# Patient Record
Sex: FEMALE | Race: OTHER | NOT HISPANIC OR LATINO
[De-identification: names, ages, dates, MRNs, and addresses within clinical notes are randomized per-mention and may not be internally consistent; named-entity substitution may affect disease eponyms.]

---

## 2017-06-06 PROBLEM — Z00.00 ENCOUNTER FOR PREVENTIVE HEALTH EXAMINATION: Status: ACTIVE | Noted: 2017-06-06

## 2017-06-07 ENCOUNTER — APPOINTMENT (OUTPATIENT)
Dept: NEPHROLOGY | Facility: CLINIC | Age: 32
End: 2017-06-07

## 2018-05-23 ENCOUNTER — EMERGENCY (EMERGENCY)
Facility: HOSPITAL | Age: 33
LOS: 1 days | Discharge: HOME | End: 2018-05-23
Attending: EMERGENCY MEDICINE | Admitting: EMERGENCY MEDICINE
Payer: MEDICAID

## 2018-05-23 VITALS
SYSTOLIC BLOOD PRESSURE: 147 MMHG | DIASTOLIC BLOOD PRESSURE: 94 MMHG | OXYGEN SATURATION: 100 % | RESPIRATION RATE: 16 BRPM | TEMPERATURE: 98 F | HEART RATE: 58 BPM

## 2018-05-23 PROCEDURE — 99282 EMERGENCY DEPT VISIT SF MDM: CPT

## 2018-05-23 NOTE — ED PROVIDER NOTE - MEDICAL DECISION MAKING DETAILS
33yo F pmhx HTN p/w CC elevated BP, normotensive in triage, asymptomatic. Will send home and rec outpatient follow up.

## 2018-05-23 NOTE — ED PROVIDER NOTE - OBJECTIVE STATEMENT
33yo F pmhx HTN p/w CC elevated BP. States that her BP has been intermittently elevated since yesterday, went as high as 180/100, reports she took her pressure earlier today because she had a headache and felt nauseous and noticed it was high. Pt. explains that she has been fasting and changed the schedule around which she takes her BP meds. Right now she is asymptomatic and has no pain/discomfort. No fever chills cp sob n/v/d.

## 2018-05-23 NOTE — ED PROVIDER NOTE - ATTENDING CONTRIBUTION TO CARE
I performed a face to face bedside interview with patient regarding history of present illness, review of symptoms and past medical history. I completed an independent physical exam.  I have discussed patient's plan of care.   I agree with note as stated above, having amended the EMR as needed to reflect my findings. I have discussed the assessment and plan of care.  This includes during the time I functioned as the attending physician for this patient.  Attending Contribution to Care: agree with plan of resident. pt p/w asymptomatic hypertension. states changed time of taking htn meds. no symptoms currently. stable for d/c. no htn here.

## 2018-05-23 NOTE — ED ADULT TRIAGE NOTE - CHIEF COMPLAINT QUOTE
Pt c/o HTN at home with systolic BP in 180s. Currently c/o HA and nausea. Denies vomiting, DP, dizziness.

## 2019-06-25 PROBLEM — I10 ESSENTIAL (PRIMARY) HYPERTENSION: Chronic | Status: ACTIVE | Noted: 2018-05-23

## 2019-06-28 ENCOUNTER — APPOINTMENT (OUTPATIENT)
Dept: OBGYN | Facility: CLINIC | Age: 34
End: 2019-06-28
Payer: MEDICAID

## 2019-06-28 ENCOUNTER — ASOB RESULT (OUTPATIENT)
Age: 34
End: 2019-06-28

## 2019-06-28 ENCOUNTER — LABORATORY RESULT (OUTPATIENT)
Age: 34
End: 2019-06-28

## 2019-06-28 VITALS
HEART RATE: 74 BPM | DIASTOLIC BLOOD PRESSURE: 87 MMHG | SYSTOLIC BLOOD PRESSURE: 136 MMHG | BODY MASS INDEX: 26.75 KG/M2 | WEIGHT: 151 LBS | HEIGHT: 63 IN

## 2019-06-28 PROCEDURE — 76817 TRANSVAGINAL US OBSTETRIC: CPT

## 2019-06-28 PROCEDURE — 99203 OFFICE O/P NEW LOW 30 MIN: CPT

## 2019-06-28 RX ORDER — NIFEDIPINE 90 MG/1
90 TABLET, EXTENDED RELEASE ORAL
Qty: 30 | Refills: 0 | Status: ACTIVE | COMMUNITY
Start: 2019-02-19

## 2019-06-28 RX ORDER — ERGOCALCIFEROL 1.25 MG/1
1.25 MG CAPSULE, LIQUID FILLED ORAL
Qty: 4 | Refills: 0 | Status: ACTIVE | COMMUNITY
Start: 2019-02-09

## 2019-06-28 RX ORDER — METHYLPREDNISOLONE 4 MG/1
4 TABLET ORAL
Qty: 21 | Refills: 0 | Status: ACTIVE | COMMUNITY
Start: 2019-02-05

## 2019-06-28 RX ORDER — OMEGA-3/DHA/EPA/FISH OIL 300-1000MG
1000 CAPSULE,DELAYED RELEASE (ENTERIC COATED) ORAL
Qty: 60 | Refills: 0 | Status: ACTIVE | COMMUNITY
Start: 2019-02-09

## 2019-06-28 RX ORDER — PRENATAL VIT NO.130/IRON/FOLIC 27MG-0.8MG
27-0.8 TABLET ORAL
Qty: 30 | Refills: 0 | Status: ACTIVE | COMMUNITY
Start: 2019-05-27

## 2019-06-28 NOTE — PHYSICAL EXAM
[Normal] : uterus [No Bleeding] : there was no active vaginal bleeding [Normal Position] : in a normal position [Uterine Adnexae] : were not tender and not enlarged

## 2019-06-28 NOTE — CHIEF COMPLAINT
[Initial Visit] : initial GYN visit [FreeTextEntry1] : 34 y.o. P 1011  LMP 19 presents for confirmation of pregnancy. pt feels  nauseous.  but well. PMH- HTN- labetalot 200mg TID, Nifedipine 90mg once daily , PCP - Dr. Sheffield. PSHx - Appendectomy- open in [facility] - . , FH- Diabetes - father, MGF, , HTN- father, CVA- father ( ) Asthma- mother , SH- negative, ROS - [employer] employee. through Q,  EGA by LMP is 5.4 weeks. OBGYN hx - primary C/S  x 1 Gestational Diabetes, , postpartum pre-eclampsia. C/S was done for unstable lie at [facility].  female, 6lbs 6oz. 2017. pt is s/p spont. ab x 1 no D&C.

## 2019-06-29 LAB
ALBUMIN SERPL ELPH-MCNC: 3.7 G/DL
ALP BLD-CCNC: 47 U/L
ALT SERPL-CCNC: 10 U/L
ANION GAP SERPL CALC-SCNC: 13 MMOL/L
AST SERPL-CCNC: 8 U/L
BASOPHILS # BLD AUTO: 0.04 K/UL
BASOPHILS NFR BLD AUTO: 0.5 %
BILIRUB SERPL-MCNC: 0.3 MG/DL
BUN SERPL-MCNC: 35 MG/DL
CALCIUM SERPL-MCNC: 10.1 MG/DL
CHLORIDE SERPL-SCNC: 105 MMOL/L
CO2 SERPL-SCNC: 19 MMOL/L
CREAT SERPL-MCNC: 1.54 MG/DL
EOSINOPHIL # BLD AUTO: 0.53 K/UL
EOSINOPHIL NFR BLD AUTO: 6.3 %
GLUCOSE 1H P 50 G GLC PO SERPL-MCNC: 94 MG/DL
GLUCOSE SERPL-MCNC: 97 MG/DL
HBV SURFACE AG SER QL: NONREACTIVE
HCT VFR BLD CALC: 37.2 %
HCV AB SER QL: NONREACTIVE
HCV S/CO RATIO: 0.15 S/CO
HGB BLD-MCNC: 11.6 G/DL
HIV1+2 AB SPEC QL IA.RAPID: NONREACTIVE
IMM GRANULOCYTES NFR BLD AUTO: 0.4 %
LYMPHOCYTES # BLD AUTO: 1.48 K/UL
LYMPHOCYTES NFR BLD AUTO: 17.5 %
MAN DIFF?: NORMAL
MCHC RBC-ENTMCNC: 27.9 PG
MCHC RBC-ENTMCNC: 31.2 GM/DL
MCV RBC AUTO: 89.4 FL
MONOCYTES # BLD AUTO: 0.65 K/UL
MONOCYTES NFR BLD AUTO: 7.7 %
NEUTROPHILS # BLD AUTO: 5.73 K/UL
NEUTROPHILS NFR BLD AUTO: 67.6 %
PLATELET # BLD AUTO: 272 K/UL
POTASSIUM SERPL-SCNC: 4.4 MMOL/L
PROT SERPL-MCNC: 6.4 G/DL
RBC # BLD: 4.16 M/UL
RBC # FLD: 14 %
SODIUM SERPL-SCNC: 137 MMOL/L
T PALLIDUM AB SER QL IA: NEGATIVE
URATE SERPL-MCNC: 8.3 MG/DL
WBC # FLD AUTO: 8.46 K/UL

## 2019-07-01 ENCOUNTER — OTHER (OUTPATIENT)
Age: 34
End: 2019-07-01

## 2019-07-01 LAB
ABO + RH PNL BLD: NORMAL
BACTERIA UR CULT: NORMAL
BLD GP AB SCN SERPL QL: NORMAL
C TRACH RRNA SPEC QL NAA+PROBE: NOT DETECTED
MEV IGG FLD QL IA: >300 AU/ML
MEV IGG+IGM SER-IMP: POSITIVE
N GONORRHOEA RRNA SPEC QL NAA+PROBE: NOT DETECTED
RUBV IGG FLD-ACNC: 2.3 INDEX
RUBV IGG SER-IMP: POSITIVE
SOURCE AMPLIFICATION: NORMAL
VZV AB TITR SER: POSITIVE
VZV IGG SER IF-ACNC: 512 INDEX

## 2019-07-02 ENCOUNTER — APPOINTMENT (OUTPATIENT)
Dept: OBGYN | Facility: CLINIC | Age: 34
End: 2019-07-02

## 2019-07-02 LAB
HGB A MFR BLD: 97.7 %
HGB A2 MFR BLD: 2.3 %
HGB FRACT BLD-IMP: NORMAL

## 2019-07-03 LAB
CREAT 24H UR-MCNC: 1.2 G/24 H
CREAT ?TM UR-MCNC: 40 MG/DL
MEV IGM SER QL: NEGATIVE
PROT 24H UR-MRATE: 79 MG/DL
PROT ?TM UR-MCNC: 24 HR
PROT UR-MCNC: 2291 MG/24 H
SPECIMEN VOL 24H UR: 2900 ML

## 2019-07-05 LAB
B19V IGG SER QL IA: 0.4 INDEX
B19V IGG+IGM SER-IMP: NEGATIVE
B19V IGG+IGM SER-IMP: NORMAL
B19V IGM FLD-ACNC: 0.3 INDEX
B19V IGM SER-ACNC: NEGATIVE
CYTOLOGY CVX/VAG DOC THIN PREP: NORMAL

## 2019-07-08 ENCOUNTER — ASOB RESULT (OUTPATIENT)
Age: 34
End: 2019-07-08

## 2019-07-08 ENCOUNTER — APPOINTMENT (OUTPATIENT)
Dept: OBGYN | Facility: CLINIC | Age: 34
End: 2019-07-08
Payer: MEDICAID

## 2019-07-08 LAB
AR GENE MUT ANL BLD/T: NEGATIVE
CFTR MUT TESTED BLD/T: NEGATIVE
FMR1 GENE MUT ANL BLD/T: NORMAL

## 2019-07-08 PROCEDURE — 76817 TRANSVAGINAL US OBSTETRIC: CPT

## 2019-07-09 ENCOUNTER — APPOINTMENT (OUTPATIENT)
Dept: OBGYN | Facility: CLINIC | Age: 34
End: 2019-07-09
Payer: MEDICAID

## 2019-07-09 VITALS
SYSTOLIC BLOOD PRESSURE: 143 MMHG | WEIGHT: 154.25 LBS | BODY MASS INDEX: 27.33 KG/M2 | HEIGHT: 63 IN | DIASTOLIC BLOOD PRESSURE: 92 MMHG

## 2019-07-09 PROCEDURE — 99213 OFFICE O/P EST LOW 20 MIN: CPT

## 2019-07-09 NOTE — CHIEF COMPLAINT
[Follow Up] : follow up GYN visit [FreeTextEntry1] : pt feels well today, pt reports that her blood pressures are lower at home, but she only takes them first thing in the morning and at night before bedtime. B.P. today is 143/92. pt is on Labetalol 200mg BID and Nifedipine 90mg once daily. \par reviewed with pt initial prenatal labs , cautioned pt re non-immunity to Parvovirus. \par Also discussed greater than 2 grams of proteinuria on 24 hour specimen. as well as borderline elevation of BUN/Creat. and elevated uric acid. , which will require close f/u post pregnancy and beyond.

## 2019-07-20 ENCOUNTER — EMERGENCY (EMERGENCY)
Facility: HOSPITAL | Age: 34
LOS: 1 days | Discharge: HOME | End: 2019-07-20
Attending: EMERGENCY MEDICINE | Admitting: EMERGENCY MEDICINE
Payer: MEDICAID

## 2019-07-20 VITALS
OXYGEN SATURATION: 100 % | HEART RATE: 71 BPM | TEMPERATURE: 98 F | DIASTOLIC BLOOD PRESSURE: 88 MMHG | RESPIRATION RATE: 18 BRPM | SYSTOLIC BLOOD PRESSURE: 142 MMHG

## 2019-07-20 PROCEDURE — 99284 EMERGENCY DEPT VISIT MOD MDM: CPT

## 2019-07-20 NOTE — ED ADULT TRIAGE NOTE - CHIEF COMPLAINT QUOTE
Presents to ED for headache.  Patient is 8 wks pregnant and states since this morning she hasn't been feeling well.  Complaining of elevated BP, headache, vomiting, fatigue and loss of appetite.  /103 prior to arriving in ED.  She took her home BP meds prior to arriving at the ED.  Denies chest pain, abdominal pain or dizziness.  LMP: 05/21/19

## 2019-07-21 VITALS
TEMPERATURE: 98 F | SYSTOLIC BLOOD PRESSURE: 114 MMHG | DIASTOLIC BLOOD PRESSURE: 65 MMHG | HEART RATE: 72 BPM | OXYGEN SATURATION: 100 % | RESPIRATION RATE: 16 BRPM

## 2019-07-21 LAB
ALBUMIN SERPL ELPH-MCNC: 3.5 G/DL — SIGNIFICANT CHANGE UP (ref 3.3–5)
ALP SERPL-CCNC: 44 U/L — SIGNIFICANT CHANGE UP (ref 40–120)
ALT FLD-CCNC: 11 U/L — SIGNIFICANT CHANGE UP (ref 4–33)
ANION GAP SERPL CALC-SCNC: 13 MMO/L — SIGNIFICANT CHANGE UP (ref 7–14)
APPEARANCE UR: CLEAR — SIGNIFICANT CHANGE UP
APTT BLD: 28.5 SEC — SIGNIFICANT CHANGE UP (ref 27.5–36.3)
AST SERPL-CCNC: 11 U/L — SIGNIFICANT CHANGE UP (ref 4–32)
BACTERIA # UR AUTO: NEGATIVE — SIGNIFICANT CHANGE UP
BASOPHILS # BLD AUTO: 0.03 K/UL — SIGNIFICANT CHANGE UP (ref 0–0.2)
BASOPHILS NFR BLD AUTO: 0.3 % — SIGNIFICANT CHANGE UP (ref 0–2)
BILIRUB SERPL-MCNC: 0.2 MG/DL — SIGNIFICANT CHANGE UP (ref 0.2–1.2)
BILIRUB UR-MCNC: NEGATIVE — SIGNIFICANT CHANGE UP
BLOOD UR QL VISUAL: SIGNIFICANT CHANGE UP
BUN SERPL-MCNC: 22 MG/DL — SIGNIFICANT CHANGE UP (ref 7–23)
CALCIUM SERPL-MCNC: 9.7 MG/DL — SIGNIFICANT CHANGE UP (ref 8.4–10.5)
CHLORIDE SERPL-SCNC: 107 MMOL/L — SIGNIFICANT CHANGE UP (ref 98–107)
CO2 SERPL-SCNC: 18 MMOL/L — LOW (ref 22–31)
COLOR SPEC: COLORLESS — SIGNIFICANT CHANGE UP
CREAT ?TM UR-MCNC: 34.8 MG/DL — SIGNIFICANT CHANGE UP
CREAT SERPL-MCNC: 1.38 MG/DL — HIGH (ref 0.5–1.3)
EOSINOPHIL # BLD AUTO: 0.64 K/UL — HIGH (ref 0–0.5)
EOSINOPHIL NFR BLD AUTO: 6 % — SIGNIFICANT CHANGE UP (ref 0–6)
FIBRINOGEN PPP-MCNC: 600.8 MG/DL — HIGH (ref 350–510)
GLUCOSE SERPL-MCNC: 96 MG/DL — SIGNIFICANT CHANGE UP (ref 70–99)
GLUCOSE UR-MCNC: NEGATIVE — SIGNIFICANT CHANGE UP
HCG SERPL-ACNC: SIGNIFICANT CHANGE UP MIU/ML
HCT VFR BLD CALC: 33.4 % — LOW (ref 34.5–45)
HGB BLD-MCNC: 10.8 G/DL — LOW (ref 11.5–15.5)
HYALINE CASTS # UR AUTO: NEGATIVE — SIGNIFICANT CHANGE UP
IMM GRANULOCYTES NFR BLD AUTO: 0.4 % — SIGNIFICANT CHANGE UP (ref 0–1.5)
INR BLD: 0.97 — SIGNIFICANT CHANGE UP (ref 0.88–1.17)
KETONES UR-MCNC: NEGATIVE — SIGNIFICANT CHANGE UP
LDH SERPL L TO P-CCNC: 143 U/L — SIGNIFICANT CHANGE UP (ref 135–225)
LEUKOCYTE ESTERASE UR-ACNC: NEGATIVE — SIGNIFICANT CHANGE UP
LIDOCAIN IGE QN: 56 U/L — SIGNIFICANT CHANGE UP (ref 7–60)
LYMPHOCYTES # BLD AUTO: 1.86 K/UL — SIGNIFICANT CHANGE UP (ref 1–3.3)
LYMPHOCYTES # BLD AUTO: 17.6 % — SIGNIFICANT CHANGE UP (ref 13–44)
MCHC RBC-ENTMCNC: 28.6 PG — SIGNIFICANT CHANGE UP (ref 27–34)
MCHC RBC-ENTMCNC: 32.3 % — SIGNIFICANT CHANGE UP (ref 32–36)
MCV RBC AUTO: 88.4 FL — SIGNIFICANT CHANGE UP (ref 80–100)
MONOCYTES # BLD AUTO: 0.89 K/UL — SIGNIFICANT CHANGE UP (ref 0–0.9)
MONOCYTES NFR BLD AUTO: 8.4 % — SIGNIFICANT CHANGE UP (ref 2–14)
NEUTROPHILS # BLD AUTO: 7.12 K/UL — SIGNIFICANT CHANGE UP (ref 1.8–7.4)
NEUTROPHILS NFR BLD AUTO: 67.3 % — SIGNIFICANT CHANGE UP (ref 43–77)
NITRITE UR-MCNC: NEGATIVE — SIGNIFICANT CHANGE UP
NRBC # FLD: 0 K/UL — SIGNIFICANT CHANGE UP (ref 0–0)
PH UR: 6 — SIGNIFICANT CHANGE UP (ref 5–8)
PLATELET # BLD AUTO: 256 K/UL — SIGNIFICANT CHANGE UP (ref 150–400)
PMV BLD: 11 FL — SIGNIFICANT CHANGE UP (ref 7–13)
POTASSIUM SERPL-MCNC: 4.2 MMOL/L — SIGNIFICANT CHANGE UP (ref 3.5–5.3)
POTASSIUM SERPL-SCNC: 4.2 MMOL/L — SIGNIFICANT CHANGE UP (ref 3.5–5.3)
PROT SERPL-MCNC: 6.9 G/DL — SIGNIFICANT CHANGE UP (ref 6–8.3)
PROT UR-MCNC: 100 — HIGH
PROT UR-MCNC: 105.4 MG/DL — SIGNIFICANT CHANGE UP
PROTHROM AB SERPL-ACNC: 10.7 SEC — SIGNIFICANT CHANGE UP (ref 9.8–13.1)
RBC # BLD: 3.78 M/UL — LOW (ref 3.8–5.2)
RBC # FLD: 13.7 % — SIGNIFICANT CHANGE UP (ref 10.3–14.5)
RBC CASTS # UR COMP ASSIST: SIGNIFICANT CHANGE UP (ref 0–?)
SODIUM SERPL-SCNC: 138 MMOL/L — SIGNIFICANT CHANGE UP (ref 135–145)
SP GR SPEC: 1.01 — SIGNIFICANT CHANGE UP (ref 1–1.04)
SQUAMOUS # UR AUTO: SIGNIFICANT CHANGE UP
URATE SERPL-MCNC: 7.9 MG/DL — HIGH (ref 2.5–7)
UROBILINOGEN FLD QL: NORMAL — SIGNIFICANT CHANGE UP
WBC # BLD: 10.58 K/UL — HIGH (ref 3.8–10.5)
WBC # FLD AUTO: 10.58 K/UL — HIGH (ref 3.8–10.5)
WBC UR QL: SIGNIFICANT CHANGE UP (ref 0–?)

## 2019-07-21 RX ORDER — SODIUM CHLORIDE 9 MG/ML
1000 INJECTION INTRAMUSCULAR; INTRAVENOUS; SUBCUTANEOUS ONCE
Refills: 0 | Status: COMPLETED | OUTPATIENT
Start: 2019-07-21 | End: 2019-07-21

## 2019-07-21 RX ORDER — METOCLOPRAMIDE HCL 10 MG
10 TABLET ORAL ONCE
Refills: 0 | Status: COMPLETED | OUTPATIENT
Start: 2019-07-21 | End: 2019-07-21

## 2019-07-21 RX ORDER — FAMOTIDINE 10 MG/ML
20 INJECTION INTRAVENOUS ONCE
Refills: 0 | Status: COMPLETED | OUTPATIENT
Start: 2019-07-21 | End: 2019-07-21

## 2019-07-21 RX ORDER — ACETAMINOPHEN 500 MG
975 TABLET ORAL ONCE
Refills: 0 | Status: COMPLETED | OUTPATIENT
Start: 2019-07-21 | End: 2019-07-21

## 2019-07-21 RX ADMIN — FAMOTIDINE 20 MILLIGRAM(S): 10 INJECTION INTRAVENOUS at 00:48

## 2019-07-21 RX ADMIN — SODIUM CHLORIDE 1000 MILLILITER(S): 9 INJECTION INTRAMUSCULAR; INTRAVENOUS; SUBCUTANEOUS at 00:49

## 2019-07-21 RX ADMIN — Medication 975 MILLIGRAM(S): at 00:48

## 2019-07-21 RX ADMIN — Medication 10 MILLIGRAM(S): at 00:48

## 2019-07-21 NOTE — ED ADULT NURSE NOTE - NSIMPLEMENTINTERV_GEN_ALL_ED
Implemented All Universal Safety Interventions:  [place] to call system. Call bell, personal items and telephone within reach. Instruct patient to call for assistance. Room bathroom lighting operational. Non-slip footwear when patient is off stretcher. Physically safe environment: no spills, clutter or unnecessary equipment. Stretcher in lowest position, wheels locked, appropriate side rails in place.

## 2019-07-21 NOTE — ED PROVIDER NOTE - NS ED ROS FT
CONSTITUTIONAL: No fevers, no chills, no lightheadedness, no dizziness  Eyes: no visual changes  Ears: no ear drainage, no ear pain  Nose: no nasal congestion  Mouth/Throat: no sore throat  CV: No chest pain, no palpitations  PULM: No SOB, no cough  GI: No n/v/d, no abd pain  : no dysuria, no hematuria  SKIN: no rashes.  NEURO: no focal weakness or numbness  PSYCHIATRIC: no known mental health issues.

## 2019-07-21 NOTE — ED PROVIDER NOTE - OBJECTIVE STATEMENT
33yo F hx htn  8 wks pregnant confirmed IUP outpt p/w R sided HA (chart review shows pt has hx of HA), gradual onset and found self to be hypertensive to 160s systolic at home, 140s in triage. Denies blurry vision, cp, sob, abd pain, n/v/d, pelvic cramping, vaginal bleeding/discharge.

## 2019-07-21 NOTE — ED PROVIDER NOTE - PROGRESS NOTE DETAILS
Jayesh Cristobal DO PGY2 - bp normalized with pain control. Can f/u safely with OB this week. Pt agrees and wants to go home

## 2019-07-21 NOTE — ED PROVIDER NOTE - ATTENDING CONTRIBUTION TO CARE
MD Pradhan:  I performed a face to face bedside interview with patient regarding history of present illness, review of symptoms and past medical history. I completed an independent physical exam(documented below).  I have discussed patient's plan of care with resident.   I agree with note as stated above, having amended the EMR as needed to reflect my findings. I have discussed the assessment and plan of care.  This includes during the time I functioned as the attending physician for this patient.  PE:  Gen: Alert, NAD  Head: NC, AT,  EOMI, normal lids/conjunctiva  ENT:  normal hearing, patent oropharynx without erythema/exudate  Neck: +supple, no tenderness/meningismus/JVD, +Trachea midline  Chest: no chest wall tenderness, equal chest rise  Pulm: Bilateral BS, normal resp effort, no wheeze/stridor/retractions  CV: RRR, no M/R/G, +dist pulses  Abd: +BS, soft, NT/ND  Rectal: deferred  Mskel: no edema/erythema/cyanosis  Skin: no rash  Neuro: AAOx3, no sensory/motor deficits, CN 2-12 intact   MDM:  35yo F,  currently @ 8wks gestation (confirmed by outpt US), pmh of pre-eclampsia and subsequently dx w/ htn, presents w/ R sided headache and hypertensive to 160s (systolic) at home. Denies abd/pelvic pain or vaginal bleeding. BP normalized to 114/65 after symptoms treated and relieved. Labs obtained, will f/u with OBGYN/pmd outpt.

## 2019-07-21 NOTE — ED PROVIDER NOTE - CLINICAL SUMMARY MEDICAL DECISION MAKING FREE TEXT BOX
Hx chronic htn unlikely new gestational htn but will get labs to r/o hypertensive OB emergencies. PAin control and reassess bp.

## 2019-07-21 NOTE — ED PROVIDER NOTE - NSFOLLOWUPINSTRUCTIONS_ED_ALL_ED_FT
- Follow up with your OB this week    - Lab and imaging results, if performed, were discussed with you along with your discharge diagnosis    - Follow up with your doctor in 1 week - bring copies of your results if you were given. If you do not have a primary doctor, please call [phone] to find one convenient for you    - Return to the ED for any new, worsening, or concerning symptoms to you    - Continue all prescribed medications    - Take ibuprofen/tylenol as directed as needed for pain    - Rest and keep yourself hydrated with fluids

## 2019-07-21 NOTE — ED ADULT NURSE NOTE - OBJECTIVE STATEMENT
received pt to room 5.. Pt is alert and oriented x4, ambulatory, who is 8 weeks pregnant and is having elevated BP and headache. No dizziness or Cp or vaginal bleeding. Pt is on Bp[ meds. c/o nausea. Labs sent as per order. 20 G SL placed in the Right AC. Md evaluation in progress.

## 2019-07-23 ENCOUNTER — APPOINTMENT (OUTPATIENT)
Dept: OBGYN | Facility: CLINIC | Age: 34
End: 2019-07-23
Payer: MEDICAID

## 2019-07-23 VITALS
WEIGHT: 153 LBS | DIASTOLIC BLOOD PRESSURE: 91 MMHG | HEIGHT: 63 IN | BODY MASS INDEX: 27.11 KG/M2 | SYSTOLIC BLOOD PRESSURE: 146 MMHG

## 2019-07-23 DIAGNOSIS — Z32.01 ENCOUNTER FOR PREGNANCY TEST, RESULT POSITIVE: ICD-10-CM

## 2019-07-23 PROCEDURE — 99213 OFFICE O/P EST LOW 20 MIN: CPT

## 2019-07-23 RX ORDER — LABETALOL HYDROCHLORIDE 300 MG/1
300 TABLET, FILM COATED ORAL
Qty: 100 | Refills: 3 | Status: ACTIVE | COMMUNITY
Start: 2019-07-23 | End: 1900-01-01

## 2019-07-23 NOTE — CHIEF COMPLAINT
[Follow Up] : follow up GYN visit [FreeTextEntry1] : pt seen in ED for elevated B.P. at home. pt evaluated and discharged. and told to follow up with PMD. pt is also c/o vaginal discharge

## 2019-08-06 ENCOUNTER — APPOINTMENT (OUTPATIENT)
Dept: OBGYN | Facility: CLINIC | Age: 34
End: 2019-08-06
Payer: MEDICAID

## 2019-08-06 VITALS
BODY MASS INDEX: 27.46 KG/M2 | DIASTOLIC BLOOD PRESSURE: 88 MMHG | HEIGHT: 63 IN | WEIGHT: 155 LBS | SYSTOLIC BLOOD PRESSURE: 137 MMHG

## 2019-08-06 PROCEDURE — 0501F PRENATAL FLOW SHEET: CPT

## 2019-08-16 ENCOUNTER — APPOINTMENT (OUTPATIENT)
Dept: ANTEPARTUM | Facility: CLINIC | Age: 34
End: 2019-08-16
Payer: MEDICAID

## 2019-08-16 ENCOUNTER — LABORATORY RESULT (OUTPATIENT)
Age: 34
End: 2019-08-16

## 2019-08-16 ENCOUNTER — ASOB RESULT (OUTPATIENT)
Age: 34
End: 2019-08-16

## 2019-08-16 PROCEDURE — 76801 OB US < 14 WKS SINGLE FETUS: CPT

## 2019-08-16 PROCEDURE — 76813 OB US NUCHAL MEAS 1 GEST: CPT

## 2019-08-16 PROCEDURE — 36416 COLLJ CAPILLARY BLOOD SPEC: CPT

## 2019-08-20 ENCOUNTER — LABORATORY RESULT (OUTPATIENT)
Age: 34
End: 2019-08-20

## 2019-09-03 ENCOUNTER — APPOINTMENT (OUTPATIENT)
Dept: OBGYN | Facility: CLINIC | Age: 34
End: 2019-09-03
Payer: MEDICAID

## 2019-09-03 VITALS
SYSTOLIC BLOOD PRESSURE: 132 MMHG | DIASTOLIC BLOOD PRESSURE: 86 MMHG | WEIGHT: 156.31 LBS | HEIGHT: 63 IN | BODY MASS INDEX: 27.7 KG/M2

## 2019-09-03 PROCEDURE — 0502F SUBSEQUENT PRENATAL CARE: CPT

## 2019-09-30 ENCOUNTER — LABORATORY RESULT (OUTPATIENT)
Age: 34
End: 2019-09-30

## 2019-10-01 ENCOUNTER — APPOINTMENT (OUTPATIENT)
Dept: OBGYN | Facility: CLINIC | Age: 34
End: 2019-10-01
Payer: MEDICAID

## 2019-10-01 VITALS
SYSTOLIC BLOOD PRESSURE: 133 MMHG | DIASTOLIC BLOOD PRESSURE: 85 MMHG | BODY MASS INDEX: 29.06 KG/M2 | HEIGHT: 63 IN | WEIGHT: 164 LBS

## 2019-10-01 PROCEDURE — 0502F SUBSEQUENT PRENATAL CARE: CPT

## 2019-10-18 ENCOUNTER — APPOINTMENT (OUTPATIENT)
Dept: ANTEPARTUM | Facility: CLINIC | Age: 34
End: 2019-10-18
Payer: MEDICAID

## 2019-10-18 PROCEDURE — 76811 OB US DETAILED SNGL FETUS: CPT

## 2019-10-29 ENCOUNTER — APPOINTMENT (OUTPATIENT)
Dept: OBGYN | Facility: CLINIC | Age: 34
End: 2019-10-29
Payer: MEDICAID

## 2019-10-29 VITALS
HEIGHT: 63 IN | WEIGHT: 164 LBS | SYSTOLIC BLOOD PRESSURE: 139 MMHG | BODY MASS INDEX: 29.06 KG/M2 | DIASTOLIC BLOOD PRESSURE: 84 MMHG

## 2019-10-29 PROCEDURE — 0502F SUBSEQUENT PRENATAL CARE: CPT

## 2019-10-30 LAB
ALBUMIN SERPL ELPH-MCNC: 3.5 G/DL
ALP BLD-CCNC: 54 U/L
ALT SERPL-CCNC: 15 U/L
ANION GAP SERPL CALC-SCNC: 12 MMOL/L
AST SERPL-CCNC: 16 U/L
BILIRUB SERPL-MCNC: <0.2 MG/DL
BUN SERPL-MCNC: 27 MG/DL
CALCIUM SERPL-MCNC: 9 MG/DL
CHLORIDE SERPL-SCNC: 107 MMOL/L
CO2 SERPL-SCNC: 18 MMOL/L
CREAT SERPL-MCNC: 1.57 MG/DL
GLUCOSE SERPL-MCNC: 74 MG/DL
POTASSIUM SERPL-SCNC: 4.8 MMOL/L
PROT SERPL-MCNC: 6.2 G/DL
SODIUM SERPL-SCNC: 137 MMOL/L

## 2019-11-01 LAB — BILE AC SER-MCNC: 3.3 UMOL/L

## 2019-11-04 LAB
BILEACIDS T: 3.1 UMOL/L
CHENDEOXYCHOLIC ACID: 1.7 UMOL/L
CHOLIC ACID: 1.4 UMOL/L
DEOXYCHOLIC ACID: <0.1 UMOL/L
URSODEOXYCH: <0.1 UMOL/L

## 2019-11-15 ENCOUNTER — ASOB RESULT (OUTPATIENT)
Age: 34
End: 2019-11-15

## 2019-11-15 ENCOUNTER — APPOINTMENT (OUTPATIENT)
Dept: ANTEPARTUM | Facility: CLINIC | Age: 34
End: 2019-11-15
Payer: MEDICAID

## 2019-11-15 PROCEDURE — 76816 OB US FOLLOW-UP PER FETUS: CPT

## 2019-11-26 ENCOUNTER — APPOINTMENT (OUTPATIENT)
Dept: OBGYN | Facility: CLINIC | Age: 34
End: 2019-11-26
Payer: MEDICAID

## 2019-11-26 VITALS
BODY MASS INDEX: 30.48 KG/M2 | SYSTOLIC BLOOD PRESSURE: 146 MMHG | HEIGHT: 63 IN | DIASTOLIC BLOOD PRESSURE: 86 MMHG | WEIGHT: 172 LBS

## 2019-11-26 PROCEDURE — 90471 IMMUNIZATION ADMIN: CPT

## 2019-11-26 PROCEDURE — 90688 IIV4 VACCINE SPLT 0.5 ML IM: CPT

## 2019-11-26 PROCEDURE — 0502F SUBSEQUENT PRENATAL CARE: CPT

## 2019-11-27 LAB
BASOPHILS # BLD AUTO: 0.04 K/UL
BASOPHILS NFR BLD AUTO: 0.3 %
EOSINOPHIL # BLD AUTO: 0.58 K/UL
EOSINOPHIL NFR BLD AUTO: 4.6 %
GLUCOSE 1H P 50 G GLC PO SERPL-MCNC: 103 MG/DL
HCT VFR BLD CALC: 29.5 %
HGB BLD-MCNC: 9.3 G/DL
IMM GRANULOCYTES NFR BLD AUTO: 2.6 %
LYMPHOCYTES # BLD AUTO: 1.55 K/UL
LYMPHOCYTES NFR BLD AUTO: 12.3 %
MAN DIFF?: NORMAL
MCHC RBC-ENTMCNC: 29.6 PG
MCHC RBC-ENTMCNC: 31.5 GM/DL
MCV RBC AUTO: 93.9 FL
MONOCYTES # BLD AUTO: 1.02 K/UL
MONOCYTES NFR BLD AUTO: 8.1 %
NEUTROPHILS # BLD AUTO: 9.04 K/UL
NEUTROPHILS NFR BLD AUTO: 72.1 %
PLATELET # BLD AUTO: 266 K/UL
RBC # BLD: 3.14 M/UL
RBC # FLD: 13.9 %
WBC # FLD AUTO: 12.56 K/UL

## 2019-12-10 ENCOUNTER — APPOINTMENT (OUTPATIENT)
Dept: OBGYN | Facility: CLINIC | Age: 34
End: 2019-12-10
Payer: MEDICAID

## 2019-12-10 VITALS
WEIGHT: 173 LBS | DIASTOLIC BLOOD PRESSURE: 92 MMHG | SYSTOLIC BLOOD PRESSURE: 156 MMHG | HEIGHT: 63 IN | BODY MASS INDEX: 30.65 KG/M2

## 2019-12-10 PROCEDURE — 0502F SUBSEQUENT PRENATAL CARE: CPT

## 2019-12-10 RX ORDER — HYDROCORTISONE ACETATE AND PRAMOXINE HYDROCHLORIDE 25; 10 MG/G; MG/G
2.5-1 CREAM TOPICAL
Qty: 2 | Refills: 1 | Status: ACTIVE | COMMUNITY
Start: 2019-12-10 | End: 1900-01-01

## 2019-12-13 ENCOUNTER — APPOINTMENT (OUTPATIENT)
Dept: ANTEPARTUM | Facility: CLINIC | Age: 34
End: 2019-12-13
Payer: MEDICAID

## 2019-12-13 ENCOUNTER — OTHER (OUTPATIENT)
Age: 34
End: 2019-12-13

## 2019-12-13 ENCOUNTER — ASOB RESULT (OUTPATIENT)
Age: 34
End: 2019-12-13

## 2019-12-13 PROCEDURE — 76819 FETAL BIOPHYS PROFIL W/O NST: CPT

## 2019-12-13 PROCEDURE — 76816 OB US FOLLOW-UP PER FETUS: CPT

## 2019-12-19 ENCOUNTER — OTHER (OUTPATIENT)
Age: 34
End: 2019-12-19

## 2019-12-23 ENCOUNTER — APPOINTMENT (OUTPATIENT)
Dept: OBGYN | Facility: CLINIC | Age: 34
End: 2019-12-23

## 2019-12-24 ENCOUNTER — APPOINTMENT (OUTPATIENT)
Dept: OBGYN | Facility: CLINIC | Age: 34
End: 2019-12-24
Payer: MEDICAID

## 2019-12-24 VITALS
DIASTOLIC BLOOD PRESSURE: 88 MMHG | SYSTOLIC BLOOD PRESSURE: 148 MMHG | WEIGHT: 173.13 LBS | HEIGHT: 63 IN | BODY MASS INDEX: 30.68 KG/M2

## 2019-12-24 PROCEDURE — 0502F SUBSEQUENT PRENATAL CARE: CPT

## 2020-01-06 LAB
ALBUMIN SERPL ELPH-MCNC: 3.1 G/DL
ALP BLD-CCNC: 73 U/L
ALT SERPL-CCNC: 11 U/L
ANION GAP SERPL CALC-SCNC: 14 MMOL/L
AST SERPL-CCNC: 16 U/L
BILE AC SER-MCNC: 3.3 UMOL/L
BILEACIDS T: 3 UMOL/L
BILIRUB SERPL-MCNC: <0.2 MG/DL
BUN SERPL-MCNC: 29 MG/DL
CALCIUM SERPL-MCNC: 9.2 MG/DL
CHENDEOXYCHOLIC ACID: 1.5 UMOL/L
CHLORIDE SERPL-SCNC: 107 MMOL/L
CHOLIC ACID: 1 UMOL/L
CO2 SERPL-SCNC: 16 MMOL/L
CREAT SERPL-MCNC: 1.65 MG/DL
DEOXYCHOLIC ACID: 0.5 UMOL/L
GLUCOSE SERPL-MCNC: 87 MG/DL
POTASSIUM SERPL-SCNC: 4.8 MMOL/L
PROT SERPL-MCNC: 6.1 G/DL
SODIUM SERPL-SCNC: 137 MMOL/L
URSODEOXYCH: <0.1 UMOL/L

## 2020-01-10 ENCOUNTER — APPOINTMENT (OUTPATIENT)
Dept: OBGYN | Facility: CLINIC | Age: 35
End: 2020-01-10
Payer: MEDICAID

## 2020-01-10 ENCOUNTER — APPOINTMENT (OUTPATIENT)
Dept: ANTEPARTUM | Facility: CLINIC | Age: 35
End: 2020-01-10
Payer: MEDICAID

## 2020-01-10 ENCOUNTER — ASOB RESULT (OUTPATIENT)
Age: 35
End: 2020-01-10

## 2020-01-10 VITALS
BODY MASS INDEX: 31.27 KG/M2 | HEIGHT: 63 IN | WEIGHT: 176.5 LBS | DIASTOLIC BLOOD PRESSURE: 96 MMHG | SYSTOLIC BLOOD PRESSURE: 158 MMHG

## 2020-01-10 PROCEDURE — 76819 FETAL BIOPHYS PROFIL W/O NST: CPT

## 2020-01-10 PROCEDURE — 0502F SUBSEQUENT PRENATAL CARE: CPT

## 2020-01-10 PROCEDURE — 76816 OB US FOLLOW-UP PER FETUS: CPT

## 2020-01-21 ENCOUNTER — APPOINTMENT (OUTPATIENT)
Dept: OBGYN | Facility: CLINIC | Age: 35
End: 2020-01-21
Payer: MEDICAID

## 2020-01-21 VITALS
WEIGHT: 178.8 LBS | SYSTOLIC BLOOD PRESSURE: 145 MMHG | DIASTOLIC BLOOD PRESSURE: 95 MMHG | BODY MASS INDEX: 31.68 KG/M2 | HEIGHT: 63 IN

## 2020-01-21 PROCEDURE — 0502F SUBSEQUENT PRENATAL CARE: CPT

## 2020-02-04 ENCOUNTER — APPOINTMENT (OUTPATIENT)
Dept: OBGYN | Facility: CLINIC | Age: 35
End: 2020-02-04
Payer: MEDICAID

## 2020-02-04 VITALS
WEIGHT: 180 LBS | SYSTOLIC BLOOD PRESSURE: 165 MMHG | HEIGHT: 63 IN | DIASTOLIC BLOOD PRESSURE: 99 MMHG | BODY MASS INDEX: 31.89 KG/M2

## 2020-02-04 PROCEDURE — 0502F SUBSEQUENT PRENATAL CARE: CPT

## 2020-02-05 LAB
ALBUMIN SERPL ELPH-MCNC: 3 G/DL
ALP BLD-CCNC: 108 U/L
ALT SERPL-CCNC: 9 U/L
ANION GAP SERPL CALC-SCNC: 17 MMOL/L
AST SERPL-CCNC: 13 U/L
BASOPHILS # BLD AUTO: 0.09 K/UL
BASOPHILS NFR BLD AUTO: 0.7 %
BILIRUB SERPL-MCNC: <0.2 MG/DL
BUN SERPL-MCNC: 36 MG/DL
CALCIUM SERPL-MCNC: 8.8 MG/DL
CHLORIDE SERPL-SCNC: 107 MMOL/L
CO2 SERPL-SCNC: 14 MMOL/L
CREAT SERPL-MCNC: 2.24 MG/DL
EOSINOPHIL # BLD AUTO: 0.41 K/UL
EOSINOPHIL NFR BLD AUTO: 3.3 %
GLUCOSE SERPL-MCNC: 44 MG/DL
HCT VFR BLD CALC: 31.3 %
HGB BLD-MCNC: 9.8 G/DL
IMM GRANULOCYTES NFR BLD AUTO: 5.7 %
LYMPHOCYTES # BLD AUTO: 1.38 K/UL
LYMPHOCYTES NFR BLD AUTO: 11.2 %
MAN DIFF?: NORMAL
MCHC RBC-ENTMCNC: 29.5 PG
MCHC RBC-ENTMCNC: 31.3 GM/DL
MCV RBC AUTO: 94.3 FL
MONOCYTES # BLD AUTO: 1.07 K/UL
MONOCYTES NFR BLD AUTO: 8.7 %
NEUTROPHILS # BLD AUTO: 8.64 K/UL
NEUTROPHILS NFR BLD AUTO: 70.4 %
PLATELET # BLD AUTO: 278 K/UL
POTASSIUM SERPL-SCNC: 5.6 MMOL/L
PROT SERPL-MCNC: 5.8 G/DL
RBC # BLD: 3.32 M/UL
RBC # FLD: 14.8 %
SODIUM SERPL-SCNC: 140 MMOL/L
URATE SERPL-MCNC: 9.3 MG/DL
WBC # FLD AUTO: 12.29 K/UL

## 2020-02-07 ENCOUNTER — APPOINTMENT (OUTPATIENT)
Dept: ANTEPARTUM | Facility: CLINIC | Age: 35
End: 2020-02-07
Payer: MEDICAID

## 2020-02-07 ENCOUNTER — OUTPATIENT (OUTPATIENT)
Dept: OUTPATIENT SERVICES | Facility: HOSPITAL | Age: 35
LOS: 1 days | End: 2020-02-07

## 2020-02-07 ENCOUNTER — ASOB RESULT (OUTPATIENT)
Age: 35
End: 2020-02-07

## 2020-02-07 ENCOUNTER — OUTPATIENT (OUTPATIENT)
Dept: OUTPATIENT SERVICES | Facility: HOSPITAL | Age: 35
LOS: 1 days | End: 2020-02-07
Payer: COMMERCIAL

## 2020-02-07 ENCOUNTER — APPOINTMENT (OUTPATIENT)
Dept: ANTEPARTUM | Facility: HOSPITAL | Age: 35
End: 2020-02-07

## 2020-02-07 VITALS
HEIGHT: 60.25 IN | SYSTOLIC BLOOD PRESSURE: 140 MMHG | WEIGHT: 177.91 LBS | HEART RATE: 103 BPM | OXYGEN SATURATION: 98 % | TEMPERATURE: 97 F | DIASTOLIC BLOOD PRESSURE: 78 MMHG | RESPIRATION RATE: 14 BRPM

## 2020-02-07 DIAGNOSIS — Z90.49 ACQUIRED ABSENCE OF OTHER SPECIFIED PARTS OF DIGESTIVE TRACT: Chronic | ICD-10-CM

## 2020-02-07 DIAGNOSIS — I10 ESSENTIAL (PRIMARY) HYPERTENSION: ICD-10-CM

## 2020-02-07 DIAGNOSIS — Z98.891 HISTORY OF UTERINE SCAR FROM PREVIOUS SURGERY: Chronic | ICD-10-CM

## 2020-02-07 DIAGNOSIS — Z34.90 ENCOUNTER FOR SUPERVISION OF NORMAL PREGNANCY, UNSPECIFIED, UNSPECIFIED TRIMESTER: ICD-10-CM

## 2020-02-07 LAB
ANION GAP SERPL CALC-SCNC: 12 MMO/L — SIGNIFICANT CHANGE UP (ref 7–14)
APPEARANCE UR: CLEAR — SIGNIFICANT CHANGE UP
BACTERIA # UR AUTO: NEGATIVE — SIGNIFICANT CHANGE UP
BILIRUB UR-MCNC: NEGATIVE — SIGNIFICANT CHANGE UP
BLD GP AB SCN SERPL QL: NEGATIVE — SIGNIFICANT CHANGE UP
BLOOD UR QL VISUAL: SIGNIFICANT CHANGE UP
BUN SERPL-MCNC: 33 MG/DL — HIGH (ref 7–23)
CALCIUM SERPL-MCNC: 8.9 MG/DL — SIGNIFICANT CHANGE UP (ref 8.4–10.5)
CHLORIDE SERPL-SCNC: 109 MMOL/L — HIGH (ref 98–107)
CO2 SERPL-SCNC: 13 MMOL/L — LOW (ref 22–31)
COLOR SPEC: SIGNIFICANT CHANGE UP
CREAT SERPL-MCNC: 2.16 MG/DL — HIGH (ref 0.5–1.3)
GLUCOSE SERPL-MCNC: 69 MG/DL — LOW (ref 70–99)
GLUCOSE UR-MCNC: NEGATIVE — SIGNIFICANT CHANGE UP
GP B STREP DNA SPEC QL NAA+PROBE: NORMAL
GP B STREP DNA SPEC QL NAA+PROBE: NOT DETECTED
HCT VFR BLD CALC: 32.5 % — LOW (ref 34.5–45)
HGB BLD-MCNC: 10.1 G/DL — LOW (ref 11.5–15.5)
HYALINE CASTS # UR AUTO: NEGATIVE — SIGNIFICANT CHANGE UP
KETONES UR-MCNC: NEGATIVE — SIGNIFICANT CHANGE UP
LEUKOCYTE ESTERASE UR-ACNC: NEGATIVE — SIGNIFICANT CHANGE UP
MCHC RBC-ENTMCNC: 28.9 PG — SIGNIFICANT CHANGE UP (ref 27–34)
MCHC RBC-ENTMCNC: 31.1 % — LOW (ref 32–36)
MCV RBC AUTO: 93.1 FL — SIGNIFICANT CHANGE UP (ref 80–100)
NITRITE UR-MCNC: NEGATIVE — SIGNIFICANT CHANGE UP
NRBC # FLD: 0 K/UL — SIGNIFICANT CHANGE UP (ref 0–0)
PH UR: 6.5 — SIGNIFICANT CHANGE UP (ref 5–8)
PLATELET # BLD AUTO: 270 K/UL — SIGNIFICANT CHANGE UP (ref 150–400)
PMV BLD: 11.5 FL — SIGNIFICANT CHANGE UP (ref 7–13)
POTASSIUM SERPL-MCNC: 4.8 MMOL/L — SIGNIFICANT CHANGE UP (ref 3.5–5.3)
POTASSIUM SERPL-SCNC: 4.8 MMOL/L — SIGNIFICANT CHANGE UP (ref 3.5–5.3)
PROT UR-MCNC: 200 — HIGH
RBC # BLD: 3.49 M/UL — LOW (ref 3.8–5.2)
RBC # FLD: 14.6 % — HIGH (ref 10.3–14.5)
RBC CASTS # UR COMP ASSIST: SIGNIFICANT CHANGE UP (ref 0–?)
RH IG SCN BLD-IMP: POSITIVE — SIGNIFICANT CHANGE UP
SODIUM SERPL-SCNC: 134 MMOL/L — LOW (ref 135–145)
SOURCE GBS: NORMAL
SP GR SPEC: 1.01 — SIGNIFICANT CHANGE UP (ref 1–1.04)
SQUAMOUS # UR AUTO: SIGNIFICANT CHANGE UP
UROBILINOGEN FLD QL: NORMAL — SIGNIFICANT CHANGE UP
WBC # BLD: 11.78 K/UL — HIGH (ref 3.8–10.5)
WBC # FLD AUTO: 11.78 K/UL — HIGH (ref 3.8–10.5)
WBC UR QL: SIGNIFICANT CHANGE UP (ref 0–?)

## 2020-02-07 PROCEDURE — 76818 FETAL BIOPHYS PROFILE W/NST: CPT | Mod: 26

## 2020-02-07 PROCEDURE — 76816 OB US FOLLOW-UP PER FETUS: CPT

## 2020-02-07 PROCEDURE — 93010 ELECTROCARDIOGRAM REPORT: CPT

## 2020-02-07 NOTE — OB PST NOTE - NS_PRENATALMEDS_OBGYN_A_OB
Aspirin/stool softener and iron supplement/Prenatal Vitamins/Antihypertensives stool softener and iron supplement/Antihypertensives/Aspirin

## 2020-02-07 NOTE — OB PST NOTE - NSHPREVIEWOFSYSTEMS_GEN_ALL_CORE
General: No fever, chills, sweating, anorexia, weight loss or weight gain. No polyphagia, polyurea, polydypsia, malaise, or fatigue    Skin: No rashes, itching, or dryness. No change in size/color of moles. No tumors, brittle nails, pitted nails, or hair loss    Breast: No tenderness, lumps, or nipple discharge      Ophthalmologic: No diplopia, photophobia, lacrimation, blurred Vision , or eye discharge    ENMT Symptoms: nasal congestion, No hearing difficulty, ear pain, tinnitus, or vertigo. No sinus symptoms, nasal   discharge, or nasal obstruction    Respiratory and Thorax: No wheezing, dyspnea, cough, hemoptysis, or pleuritic chest pain     Cardiovascular: h/o hypertension, No chest pain, palpitations, dyspnea on exertion, orthopnea, paroxysmal nocturnal dyspnea,   peripheral edema, or claudication    Gastrointestinal: constipation, No nausea, vomiting, diarrhea,  change in bowel habits, flatulence, abdominal pain, or melena    Genitourinary/ Pelvis: No hematuria, renal colic, or flank pain.  No urine discoloration, incontinence, dysuria, or urinary hesitancy. Normal urinary frequency. No nocturia, abnormal vaginal bleeding, vaginal discharge, spotting, pelvic pain, or vaginal leakage    Musculoskeletal: No arthralgia, arthritis, joint swelling, muscle cramping, muscle weakness, neck pain, arm pain, or leg pain    Neurological: No transient paralysis, weakness, paresthesias, or seizures. No syncope, tremors, vertigo, loss of sensation, difficulty walking, loss of consciousness, hemiparesis, confusion, or facial palsy    Psychiatric: No suicidal ideation, depression, anxiety, insomnia, memory loss, paranoia, mood swings, agitation, hallucinations, or hyperactivity    Hematology: No gum bleeding, nose bleeding, or skin lumps    Lymphatic: No enlarged or tender lymph nodes. No extremity swelling    Endocrine: No heat or cold intolerance    Immunologic: No recurrent or persistent infections

## 2020-02-07 NOTE — OB PST NOTE - ASSESSMENT
34 y.o. female  with preop diagnosis of essential hypertension, other specified postprocedural states

## 2020-02-07 NOTE — OB PST NOTE - NSHPPHYSICALEXAM_GEN_ALL_CORE
Constitutional: Well Developed, Well Groomed, Well Nourished, No Distress    Eyes: PERRL, EOMI, conjunctiva clear    Ears: Normal    Mouth & Gums: Normal, moist    Pharynx: No tenderness, discharge, or peritonsillar abscess    Tonsils: No Redness, discharge, tenderness, or swelling    Neck: Supple, no JVD, normal thyroid glands, no carotid bruits, no cervical vertebral or paraspinal tenderness    Breast: Normal shape, no masses, no tenderness, nipples normal, no nipple discharge    Back: Normal shape, ROM intact, strength intact, no vertebral tenderness    Respiratory: Airway patent, breath sounds equal, good air movement, respiration non-labored, clear to auscultation bilateral, no chest wall tenderness, no intercostal retractions, no rales, no wheezes, no rhonchi, no subcutaneous emphysema    Cardiovascular:  Regular rate and rhythm, no rubs or murmur, normal PMI    Gastrointestinal: gravid abdomen    Extremities: No clubbing, cyanosis, or pedal edema    Vascular:  Radial Pulse normal, DP pulse normal, PT pulse normal    Neurological: alert & oriented x 3, sensation intact, deep reflexes intact, cranial nerve intact, normal strength    Skin: warm and dry, normal color    Lymph Nodes: normal posterior cervical lymph node, normal anterior cervical lymph node, normal supraclavicular lymph node    Musculoskeletal: ROM intact, no joint swelling, warmth, or calf tenderness. Normal strength    Psychiatric: normal affect, normal behavior

## 2020-02-07 NOTE — OB PST NOTE - PROBLEM SELECTOR PLAN 1
pt scheduled for repeat  on 2020  Preop instructions provided. Pt verbalized understanding.    written and verbal instructions with teach back on chlorhexidine shampoo provided,  pt verbalized understanding with returned demonstration

## 2020-02-07 NOTE — OB PST NOTE - HISTORY OF PRESENT ILLNESS
34 y.o. female , scheduled for Repeat   hx of essential hypertension  pt reports good fetal movements, denies complications with current pregnancy 34 y.o. female , scheduled for Repeat   hx of essential hypertension  pt reports good fetal movements, denies complications with current pregnancy  2017  @38 weeks gestation for elevated BP and fetal presentation

## 2020-02-07 NOTE — OB PST NOTE - FAMILY HISTORY
Father  Still living? No  Diabetes mellitus, Age at diagnosis: Age Unknown  Hypertension, Age at diagnosis: Age Unknown  Family history of heart attack, Age at diagnosis: Age Unknown     Mother  Still living? Yes, Estimated age: Age Unknown  Asthma, Age at diagnosis: Age Unknown

## 2020-02-08 LAB — T PALLIDUM AB TITR SER: NEGATIVE — SIGNIFICANT CHANGE UP

## 2020-02-11 ENCOUNTER — APPOINTMENT (OUTPATIENT)
Dept: OBGYN | Facility: CLINIC | Age: 35
End: 2020-02-11
Payer: MEDICAID

## 2020-02-11 VITALS
DIASTOLIC BLOOD PRESSURE: 102 MMHG | SYSTOLIC BLOOD PRESSURE: 173 MMHG | HEIGHT: 63 IN | WEIGHT: 183 LBS | BODY MASS INDEX: 32.43 KG/M2

## 2020-02-11 DIAGNOSIS — Z3A.36 36 WEEKS GESTATION OF PREGNANCY: ICD-10-CM

## 2020-02-11 DIAGNOSIS — Z34.91 ENCOUNTER FOR SUPERVISION OF NORMAL PREGNANCY, UNSPECIFIED, FIRST TRIMESTER: ICD-10-CM

## 2020-02-11 DIAGNOSIS — O10.013 PRE-EXISTING ESSENTIAL HYPERTENSION COMPLICATING PREGNANCY, THIRD TRIMESTER: ICD-10-CM

## 2020-02-11 DIAGNOSIS — O32.2XX0 MATERNAL CARE FOR TRANSVERSE AND OBLIQUE LIE, NOT APPLICABLE OR UNSPECIFIED: ICD-10-CM

## 2020-02-11 DIAGNOSIS — O34.211 MATERNAL CARE FOR LOW TRANSVERSE SCAR FROM PREVIOUS CESAREAN DELIVERY: ICD-10-CM

## 2020-02-11 PROBLEM — Z34.90 ENCOUNTER FOR SUPERVISION OF NORMAL PREGNANCY, UNSPECIFIED, UNSPECIFIED TRIMESTER: Chronic | Status: ACTIVE | Noted: 2020-02-07

## 2020-02-11 PROCEDURE — 0502F SUBSEQUENT PRENATAL CARE: CPT

## 2020-02-12 ENCOUNTER — TRANSCRIPTION ENCOUNTER (OUTPATIENT)
Age: 35
End: 2020-02-12

## 2020-02-13 ENCOUNTER — APPOINTMENT (OUTPATIENT)
Dept: OBGYN | Facility: HOSPITAL | Age: 35
End: 2020-02-13

## 2020-02-13 ENCOUNTER — TRANSCRIPTION ENCOUNTER (OUTPATIENT)
Age: 35
End: 2020-02-13

## 2020-02-13 ENCOUNTER — INPATIENT (INPATIENT)
Facility: HOSPITAL | Age: 35
LOS: 3 days | Discharge: HOME | End: 2020-02-17
Attending: OBSTETRICS & GYNECOLOGY | Admitting: OBSTETRICS & GYNECOLOGY
Payer: COMMERCIAL

## 2020-02-13 VITALS — DIASTOLIC BLOOD PRESSURE: 91 MMHG | HEART RATE: 72 BPM | SYSTOLIC BLOOD PRESSURE: 162 MMHG

## 2020-02-13 DIAGNOSIS — Z90.49 ACQUIRED ABSENCE OF OTHER SPECIFIED PARTS OF DIGESTIVE TRACT: Chronic | ICD-10-CM

## 2020-02-13 DIAGNOSIS — Z96.89 PRESENCE OF OTHER SPECIFIED FUNCTIONAL IMPLANTS: ICD-10-CM

## 2020-02-13 DIAGNOSIS — Z98.891 HISTORY OF UTERINE SCAR FROM PREVIOUS SURGERY: Chronic | ICD-10-CM

## 2020-02-13 DIAGNOSIS — Z34.90 ENCOUNTER FOR SUPERVISION OF NORMAL PREGNANCY, UNSPECIFIED, UNSPECIFIED TRIMESTER: ICD-10-CM

## 2020-02-13 LAB
ALBUMIN SERPL ELPH-MCNC: 2.6 G/DL — LOW (ref 3.3–5)
ALBUMIN SERPL ELPH-MCNC: 3.1 G/DL — LOW (ref 3.3–5)
ALP SERPL-CCNC: 108 U/L — SIGNIFICANT CHANGE UP (ref 40–120)
ALP SERPL-CCNC: 93 U/L — SIGNIFICANT CHANGE UP (ref 40–120)
ALT FLD-CCNC: 13 U/L — SIGNIFICANT CHANGE UP (ref 4–33)
ALT FLD-CCNC: 15 U/L — SIGNIFICANT CHANGE UP (ref 4–33)
ANION GAP SERPL CALC-SCNC: 10 MMO/L — SIGNIFICANT CHANGE UP (ref 7–14)
ANION GAP SERPL CALC-SCNC: 12 MMO/L — SIGNIFICANT CHANGE UP (ref 7–14)
ANISOCYTOSIS BLD QL: SLIGHT — SIGNIFICANT CHANGE UP
APPEARANCE UR: CLEAR — SIGNIFICANT CHANGE UP
APTT BLD: 24.7 SEC — LOW (ref 27.5–36.3)
APTT BLD: 26.3 SEC — LOW (ref 27.5–36.3)
AST SERPL-CCNC: 12 U/L — SIGNIFICANT CHANGE UP (ref 4–32)
AST SERPL-CCNC: 19 U/L — SIGNIFICANT CHANGE UP (ref 4–32)
BACTERIA # UR AUTO: NEGATIVE — SIGNIFICANT CHANGE UP
BASOPHILS # BLD AUTO: 0.04 K/UL — SIGNIFICANT CHANGE UP (ref 0–0.2)
BASOPHILS NFR BLD AUTO: 0.3 % — SIGNIFICANT CHANGE UP (ref 0–2)
BASOPHILS NFR SPEC: 0.9 % — SIGNIFICANT CHANGE UP (ref 0–2)
BILIRUB SERPL-MCNC: < 0.2 MG/DL — LOW (ref 0.2–1.2)
BILIRUB SERPL-MCNC: < 0.2 MG/DL — LOW (ref 0.2–1.2)
BILIRUB UR-MCNC: NEGATIVE — SIGNIFICANT CHANGE UP
BLASTS # FLD: 0 % — SIGNIFICANT CHANGE UP (ref 0–0)
BLD GP AB SCN SERPL QL: NEGATIVE — SIGNIFICANT CHANGE UP
BLOOD UR QL VISUAL: SIGNIFICANT CHANGE UP
BUN SERPL-MCNC: 33 MG/DL — HIGH (ref 7–23)
BUN SERPL-MCNC: 36 MG/DL — HIGH (ref 7–23)
CALCIUM SERPL-MCNC: 9.1 MG/DL — SIGNIFICANT CHANGE UP (ref 8.4–10.5)
CALCIUM SERPL-MCNC: 9.5 MG/DL — SIGNIFICANT CHANGE UP (ref 8.4–10.5)
CHLORIDE SERPL-SCNC: 108 MMOL/L — HIGH (ref 98–107)
CHLORIDE SERPL-SCNC: 109 MMOL/L — HIGH (ref 98–107)
CO2 SERPL-SCNC: 15 MMOL/L — LOW (ref 22–31)
CO2 SERPL-SCNC: 16 MMOL/L — LOW (ref 22–31)
COLOR SPEC: SIGNIFICANT CHANGE UP
CREAT ?TM UR-MCNC: 42.7 MG/DL — SIGNIFICANT CHANGE UP
CREAT SERPL-MCNC: 2.17 MG/DL — HIGH (ref 0.5–1.3)
CREAT SERPL-MCNC: 2.2 MG/DL — HIGH (ref 0.5–1.3)
EOSINOPHIL # BLD AUTO: 0.19 K/UL — SIGNIFICANT CHANGE UP (ref 0–0.5)
EOSINOPHIL NFR BLD AUTO: 1.3 % — SIGNIFICANT CHANGE UP (ref 0–6)
EOSINOPHIL NFR FLD: 5.3 % — SIGNIFICANT CHANGE UP (ref 0–6)
FIBRINOGEN PPP-MCNC: 664.6 MG/DL — HIGH (ref 350–510)
FIBRINOGEN PPP-MCNC: 818 MG/DL — HIGH (ref 350–510)
GIANT PLATELETS BLD QL SMEAR: PRESENT — SIGNIFICANT CHANGE UP
GLUCOSE SERPL-MCNC: 85 MG/DL — SIGNIFICANT CHANGE UP (ref 70–99)
GLUCOSE SERPL-MCNC: 89 MG/DL — SIGNIFICANT CHANGE UP (ref 70–99)
GLUCOSE UR-MCNC: NEGATIVE — SIGNIFICANT CHANGE UP
HCT VFR BLD CALC: 29.7 % — LOW (ref 34.5–45)
HGB BLD-MCNC: 9.8 G/DL — LOW (ref 11.5–15.5)
HYALINE CASTS # UR AUTO: NEGATIVE — SIGNIFICANT CHANGE UP
IMM GRANULOCYTES NFR BLD AUTO: 1.5 % — SIGNIFICANT CHANGE UP (ref 0–1.5)
INR BLD: 0.84 — LOW (ref 0.88–1.17)
INR BLD: 0.87 — LOW (ref 0.88–1.17)
KETONES UR-MCNC: NEGATIVE — SIGNIFICANT CHANGE UP
LDH SERPL L TO P-CCNC: 181 U/L — SIGNIFICANT CHANGE UP (ref 135–225)
LDH SERPL L TO P-CCNC: 248 U/L — HIGH (ref 135–225)
LEUKOCYTE ESTERASE UR-ACNC: NEGATIVE — SIGNIFICANT CHANGE UP
LYMPHOCYTES # BLD AUTO: 0.7 K/UL — LOW (ref 1–3.3)
LYMPHOCYTES # BLD AUTO: 4.8 % — LOW (ref 13–44)
LYMPHOCYTES NFR SPEC AUTO: 6.2 % — LOW (ref 13–44)
MACROCYTES BLD QL: SLIGHT — SIGNIFICANT CHANGE UP
MAGNESIUM SERPL-MCNC: 6.4 MG/DL — HIGH (ref 1.6–2.6)
MCHC RBC-ENTMCNC: 30.2 PG — SIGNIFICANT CHANGE UP (ref 27–34)
MCHC RBC-ENTMCNC: 33 % — SIGNIFICANT CHANGE UP (ref 32–36)
MCV RBC AUTO: 91.4 FL — SIGNIFICANT CHANGE UP (ref 80–100)
METAMYELOCYTES # FLD: 0.9 % — SIGNIFICANT CHANGE UP (ref 0–1)
MONOCYTES # BLD AUTO: 0.78 K/UL — SIGNIFICANT CHANGE UP (ref 0–0.9)
MONOCYTES NFR BLD AUTO: 5.3 % — SIGNIFICANT CHANGE UP (ref 2–14)
MONOCYTES NFR BLD: 4.5 % — SIGNIFICANT CHANGE UP (ref 2–9)
MYELOCYTES NFR BLD: 0.9 % — HIGH (ref 0–0)
NEUTROPHIL AB SER-ACNC: 79.5 % — HIGH (ref 43–77)
NEUTROPHILS # BLD AUTO: 12.7 K/UL — HIGH (ref 1.8–7.4)
NEUTROPHILS NFR BLD AUTO: 86.8 % — HIGH (ref 43–77)
NEUTS BAND # BLD: 0.9 % — SIGNIFICANT CHANGE UP (ref 0–6)
NITRITE UR-MCNC: NEGATIVE — SIGNIFICANT CHANGE UP
NRBC # FLD: 0 K/UL — SIGNIFICANT CHANGE UP (ref 0–0)
OTHER - HEMATOLOGY %: 0 — SIGNIFICANT CHANGE UP
PH UR: 6.5 — SIGNIFICANT CHANGE UP (ref 5–8)
PLATELET # BLD AUTO: 232 K/UL — SIGNIFICANT CHANGE UP (ref 150–400)
PLATELET COUNT - ESTIMATE: NORMAL — SIGNIFICANT CHANGE UP
PMV BLD: 11.8 FL — SIGNIFICANT CHANGE UP (ref 7–13)
POLYCHROMASIA BLD QL SMEAR: SLIGHT — SIGNIFICANT CHANGE UP
POTASSIUM SERPL-MCNC: 4.5 MMOL/L — SIGNIFICANT CHANGE UP (ref 3.5–5.3)
POTASSIUM SERPL-MCNC: 4.7 MMOL/L — SIGNIFICANT CHANGE UP (ref 3.5–5.3)
POTASSIUM SERPL-SCNC: 4.5 MMOL/L — SIGNIFICANT CHANGE UP (ref 3.5–5.3)
POTASSIUM SERPL-SCNC: 4.7 MMOL/L — SIGNIFICANT CHANGE UP (ref 3.5–5.3)
PROMYELOCYTES # FLD: 0 % — SIGNIFICANT CHANGE UP (ref 0–0)
PROT SERPL-MCNC: 5.5 G/DL — LOW (ref 6–8.3)
PROT SERPL-MCNC: 6.6 G/DL — SIGNIFICANT CHANGE UP (ref 6–8.3)
PROT UR-MCNC: 198.9 MG/DL — SIGNIFICANT CHANGE UP
PROT UR-MCNC: 200 — HIGH
PROTHROM AB SERPL-ACNC: 9.5 SEC — LOW (ref 9.8–13.1)
PROTHROM AB SERPL-ACNC: 9.6 SEC — LOW (ref 9.8–13.1)
RBC # BLD: 3.25 M/UL — LOW (ref 3.8–5.2)
RBC # FLD: 14.5 % — SIGNIFICANT CHANGE UP (ref 10.3–14.5)
RBC CASTS # UR COMP ASSIST: SIGNIFICANT CHANGE UP (ref 0–?)
RH IG SCN BLD-IMP: POSITIVE — SIGNIFICANT CHANGE UP
RH IG SCN BLD-IMP: POSITIVE — SIGNIFICANT CHANGE UP
SODIUM SERPL-SCNC: 135 MMOL/L — SIGNIFICANT CHANGE UP (ref 135–145)
SODIUM SERPL-SCNC: 135 MMOL/L — SIGNIFICANT CHANGE UP (ref 135–145)
SP GR SPEC: 1.01 — SIGNIFICANT CHANGE UP (ref 1–1.04)
SQUAMOUS # UR AUTO: SIGNIFICANT CHANGE UP
URATE SERPL-MCNC: 8.7 MG/DL — HIGH (ref 2.5–7)
URATE SERPL-MCNC: 9.4 MG/DL — HIGH (ref 2.5–7)
UROBILINOGEN FLD QL: NORMAL — SIGNIFICANT CHANGE UP
VARIANT LYMPHS # BLD: 0.9 % — SIGNIFICANT CHANGE UP
WBC # BLD: 14.63 K/UL — HIGH (ref 3.8–10.5)
WBC # FLD AUTO: 14.63 K/UL — HIGH (ref 3.8–10.5)
WBC UR QL: SIGNIFICANT CHANGE UP (ref 0–?)

## 2020-02-13 PROCEDURE — 59510 CESAREAN DELIVERY: CPT | Mod: U9,UB,GC

## 2020-02-13 RX ORDER — OXYCODONE HYDROCHLORIDE 5 MG/1
5 TABLET ORAL
Refills: 0 | Status: DISCONTINUED | OUTPATIENT
Start: 2020-02-13 | End: 2020-02-14

## 2020-02-13 RX ORDER — LABETALOL HCL 100 MG
200 TABLET ORAL THREE TIMES A DAY
Refills: 0 | Status: DISCONTINUED | OUTPATIENT
Start: 2020-02-13 | End: 2020-02-17

## 2020-02-13 RX ORDER — SODIUM CHLORIDE 9 MG/ML
1000 INJECTION, SOLUTION INTRAVENOUS
Refills: 0 | Status: DISCONTINUED | OUTPATIENT
Start: 2020-02-13 | End: 2020-02-14

## 2020-02-13 RX ORDER — ONDANSETRON 8 MG/1
4 TABLET, FILM COATED ORAL ONCE
Refills: 0 | Status: DISCONTINUED | OUTPATIENT
Start: 2020-02-13 | End: 2020-02-13

## 2020-02-13 RX ORDER — KETOROLAC TROMETHAMINE 30 MG/ML
30 SYRINGE (ML) INJECTION EVERY 6 HOURS
Refills: 0 | Status: DISCONTINUED | OUTPATIENT
Start: 2020-02-13 | End: 2020-02-14

## 2020-02-13 RX ORDER — IBUPROFEN 200 MG
600 TABLET ORAL EVERY 6 HOURS
Refills: 0 | Status: DISCONTINUED | OUTPATIENT
Start: 2020-02-13 | End: 2020-02-14

## 2020-02-13 RX ORDER — HYDROMORPHONE HYDROCHLORIDE 2 MG/ML
1 INJECTION INTRAMUSCULAR; INTRAVENOUS; SUBCUTANEOUS
Refills: 0 | Status: DISCONTINUED | OUTPATIENT
Start: 2020-02-13 | End: 2020-02-13

## 2020-02-13 RX ORDER — ONDANSETRON 8 MG/1
4 TABLET, FILM COATED ORAL EVERY 6 HOURS
Refills: 0 | Status: DISCONTINUED | OUTPATIENT
Start: 2020-02-13 | End: 2020-02-14

## 2020-02-13 RX ORDER — SIMETHICONE 80 MG/1
80 TABLET, CHEWABLE ORAL EVERY 4 HOURS
Refills: 0 | Status: DISCONTINUED | OUTPATIENT
Start: 2020-02-13 | End: 2020-02-17

## 2020-02-13 RX ORDER — HYDROMORPHONE HYDROCHLORIDE 2 MG/ML
0.5 INJECTION INTRAMUSCULAR; INTRAVENOUS; SUBCUTANEOUS
Refills: 0 | Status: DISCONTINUED | OUTPATIENT
Start: 2020-02-13 | End: 2020-02-13

## 2020-02-13 RX ORDER — IBUPROFEN 200 MG
1 TABLET ORAL
Qty: 0 | Refills: 0 | DISCHARGE
Start: 2020-02-13

## 2020-02-13 RX ORDER — NIFEDIPINE 30 MG
90 TABLET, EXTENDED RELEASE 24 HR ORAL DAILY
Refills: 0 | Status: DISCONTINUED | OUTPATIENT
Start: 2020-02-13 | End: 2020-02-17

## 2020-02-13 RX ORDER — OXYCODONE HYDROCHLORIDE 5 MG/1
5 TABLET ORAL
Refills: 0 | Status: COMPLETED | OUTPATIENT
Start: 2020-02-13 | End: 2020-02-20

## 2020-02-13 RX ORDER — ACETAMINOPHEN 500 MG
3 TABLET ORAL
Qty: 0 | Refills: 0 | DISCHARGE
Start: 2020-02-13

## 2020-02-13 RX ORDER — HEPARIN SODIUM 5000 [USP'U]/ML
5000 INJECTION INTRAVENOUS; SUBCUTANEOUS EVERY 12 HOURS
Refills: 0 | Status: DISCONTINUED | OUTPATIENT
Start: 2020-02-13 | End: 2020-02-17

## 2020-02-13 RX ORDER — FERROUS SULFATE 325(65) MG
1 TABLET ORAL
Qty: 0 | Refills: 0 | DISCHARGE

## 2020-02-13 RX ORDER — LANOLIN
1 OINTMENT (GRAM) TOPICAL EVERY 6 HOURS
Refills: 0 | Status: DISCONTINUED | OUTPATIENT
Start: 2020-02-13 | End: 2020-02-17

## 2020-02-13 RX ORDER — HYDRALAZINE HCL 50 MG
5 TABLET ORAL ONCE
Refills: 0 | Status: COMPLETED | OUTPATIENT
Start: 2020-02-13 | End: 2020-02-13

## 2020-02-13 RX ORDER — MAGNESIUM HYDROXIDE 400 MG/1
30 TABLET, CHEWABLE ORAL
Refills: 0 | Status: DISCONTINUED | OUTPATIENT
Start: 2020-02-13 | End: 2020-02-17

## 2020-02-13 RX ORDER — LABETALOL HCL 100 MG
200 TABLET ORAL THREE TIMES A DAY
Refills: 0 | Status: DISCONTINUED | OUTPATIENT
Start: 2020-02-13 | End: 2020-02-13

## 2020-02-13 RX ORDER — LEVETIRACETAM 250 MG/1
500 TABLET, FILM COATED ORAL
Refills: 0 | Status: DISCONTINUED | OUTPATIENT
Start: 2020-02-13 | End: 2020-02-14

## 2020-02-13 RX ORDER — ASPIRIN/CALCIUM CARB/MAGNESIUM 324 MG
1 TABLET ORAL
Qty: 0 | Refills: 0 | DISCHARGE

## 2020-02-13 RX ORDER — TETANUS TOXOID, REDUCED DIPHTHERIA TOXOID AND ACELLULAR PERTUSSIS VACCINE, ADSORBED 5; 2.5; 8; 8; 2.5 [IU]/.5ML; [IU]/.5ML; UG/.5ML; UG/.5ML; UG/.5ML
0.5 SUSPENSION INTRAMUSCULAR ONCE
Refills: 0 | Status: DISCONTINUED | OUTPATIENT
Start: 2020-02-13 | End: 2020-02-17

## 2020-02-13 RX ORDER — OXYTOCIN 10 UNIT/ML
333.33 VIAL (ML) INJECTION
Qty: 20 | Refills: 0 | Status: DISCONTINUED | OUTPATIENT
Start: 2020-02-13 | End: 2020-02-13

## 2020-02-13 RX ORDER — MAGNESIUM SULFATE 500 MG/ML
4 VIAL (ML) INJECTION ONCE
Refills: 0 | Status: COMPLETED | OUTPATIENT
Start: 2020-02-13 | End: 2020-02-13

## 2020-02-13 RX ORDER — HYDROMORPHONE HYDROCHLORIDE 2 MG/ML
1 INJECTION INTRAMUSCULAR; INTRAVENOUS; SUBCUTANEOUS
Refills: 0 | Status: DISCONTINUED | OUTPATIENT
Start: 2020-02-13 | End: 2020-02-14

## 2020-02-13 RX ORDER — DIPHENHYDRAMINE HCL 50 MG
25 CAPSULE ORAL EVERY 6 HOURS
Refills: 0 | Status: DISCONTINUED | OUTPATIENT
Start: 2020-02-13 | End: 2020-02-17

## 2020-02-13 RX ORDER — MAGNESIUM SULFATE 500 MG/ML
2 VIAL (ML) INJECTION
Qty: 40 | Refills: 0 | Status: DISCONTINUED | OUTPATIENT
Start: 2020-02-13 | End: 2020-02-13

## 2020-02-13 RX ORDER — GLYCERIN ADULT
1 SUPPOSITORY, RECTAL RECTAL AT BEDTIME
Refills: 0 | Status: DISCONTINUED | OUTPATIENT
Start: 2020-02-13 | End: 2020-02-17

## 2020-02-13 RX ORDER — BUTORPHANOL TARTRATE 2 MG/ML
0.12 INJECTION, SOLUTION INTRAMUSCULAR; INTRAVENOUS EVERY 6 HOURS
Refills: 0 | Status: DISCONTINUED | OUTPATIENT
Start: 2020-02-13 | End: 2020-02-14

## 2020-02-13 RX ORDER — OXYCODONE HYDROCHLORIDE 5 MG/1
10 TABLET ORAL
Refills: 0 | Status: DISCONTINUED | OUTPATIENT
Start: 2020-02-13 | End: 2020-02-14

## 2020-02-13 RX ORDER — ACETAMINOPHEN 500 MG
1000 TABLET ORAL ONCE
Refills: 0 | Status: COMPLETED | OUTPATIENT
Start: 2020-02-13 | End: 2020-02-13

## 2020-02-13 RX ORDER — DOCUSATE SODIUM 100 MG
1 CAPSULE ORAL
Qty: 0 | Refills: 0 | DISCHARGE

## 2020-02-13 RX ORDER — ACETAMINOPHEN 500 MG
975 TABLET ORAL
Refills: 0 | Status: DISCONTINUED | OUTPATIENT
Start: 2020-02-13 | End: 2020-02-17

## 2020-02-13 RX ORDER — OXYCODONE HYDROCHLORIDE 5 MG/1
5 TABLET ORAL ONCE
Refills: 0 | Status: DISCONTINUED | OUTPATIENT
Start: 2020-02-13 | End: 2020-02-17

## 2020-02-13 RX ORDER — MAGNESIUM SULFATE 500 MG/ML
1 VIAL (ML) INJECTION
Qty: 40 | Refills: 0 | Status: DISCONTINUED | OUTPATIENT
Start: 2020-02-13 | End: 2020-02-13

## 2020-02-13 RX ORDER — NALOXONE HYDROCHLORIDE 4 MG/.1ML
0.1 SPRAY NASAL
Refills: 0 | Status: DISCONTINUED | OUTPATIENT
Start: 2020-02-13 | End: 2020-02-14

## 2020-02-13 RX ORDER — SODIUM CHLORIDE 9 MG/ML
1000 INJECTION, SOLUTION INTRAVENOUS
Refills: 0 | Status: DISCONTINUED | OUTPATIENT
Start: 2020-02-13 | End: 2020-02-13

## 2020-02-13 RX ORDER — OXYTOCIN 10 UNIT/ML
333.33 VIAL (ML) INJECTION
Qty: 20 | Refills: 0 | Status: DISCONTINUED | OUTPATIENT
Start: 2020-02-13 | End: 2020-02-14

## 2020-02-13 RX ORDER — NIFEDIPINE 30 MG
90 TABLET, EXTENDED RELEASE 24 HR ORAL DAILY
Refills: 0 | Status: DISCONTINUED | OUTPATIENT
Start: 2020-02-13 | End: 2020-02-13

## 2020-02-13 RX ADMIN — Medication 300 GRAM(S): at 14:43

## 2020-02-13 RX ADMIN — Medication 30 MILLIGRAM(S): at 21:12

## 2020-02-13 RX ADMIN — Medication 400 MILLIGRAM(S): at 10:40

## 2020-02-13 RX ADMIN — Medication 50 GM/HR: at 18:50

## 2020-02-13 RX ADMIN — SODIUM CHLORIDE 75 MILLILITER(S): 9 INJECTION, SOLUTION INTRAVENOUS at 14:11

## 2020-02-13 RX ADMIN — Medication 50 GM/HR: at 15:06

## 2020-02-13 RX ADMIN — Medication 5 MILLIGRAM(S): at 14:35

## 2020-02-13 RX ADMIN — Medication 200 MILLIGRAM(S): at 21:20

## 2020-02-13 RX ADMIN — Medication 1000 MILLIGRAM(S): at 11:55

## 2020-02-13 RX ADMIN — Medication 50 GM/HR: at 19:32

## 2020-02-13 RX ADMIN — LEVETIRACETAM 500 MILLIGRAM(S): 250 TABLET, FILM COATED ORAL at 22:09

## 2020-02-13 RX ADMIN — HEPARIN SODIUM 5000 UNIT(S): 5000 INJECTION INTRAVENOUS; SUBCUTANEOUS at 21:12

## 2020-02-13 RX ADMIN — Medication 1000 MILLIUNIT(S)/MIN: at 14:11

## 2020-02-13 RX ADMIN — Medication 200 MILLIGRAM(S): at 14:19

## 2020-02-13 RX ADMIN — Medication 1000 MILLIUNIT(S)/MIN: at 15:07

## 2020-02-13 RX ADMIN — Medication 90 MILLIGRAM(S): at 22:06

## 2020-02-13 RX ADMIN — ONDANSETRON 4 MILLIGRAM(S): 8 TABLET, FILM COATED ORAL at 18:25

## 2020-02-13 RX ADMIN — Medication 30 MILLIGRAM(S): at 21:45

## 2020-02-13 NOTE — DISCHARGE NOTE OB - HOSPITAL COURSE
34 you. P 1011 previous c/s  x 1 admitted on 20 at 37+ weeks for scheduled  section due to worsening pre-eclampsia, superimposed. [facility] Dr. Mccoy , complicated by superimposed pre-eclampsia. . pt underwent a RLST C/S  , and delivered a live female . pt did well post op and was discharged home on POD # 3 . pt is to continue on antihypertensives and f/u in 1 week for B.P. check.

## 2020-02-13 NOTE — DISCHARGE NOTE OB - PATIENT PORTAL LINK FT
You can access the FollowMyHealth Patient Portal offered by [facility] by registering at the following website: [email or web address]. By joining "[email or web address]’s FollowMyHealth portal, you will also be able to view your health information using other applications (apps) compatible with our system.

## 2020-02-13 NOTE — DISCHARGE NOTE OB - CARE PROVIDERS DIRECT ADDRESSES
,DirectAddress_Unknown ,DirectAddress_Unknown,[email or web address]

## 2020-02-13 NOTE — OB POSTPARTUM EVENT NOTE - NS_REEVALUATE1_OBGYN_ALL_OB
[facility]  [address]  [place]  [address]  [phone]  _________________________________________________________________________________    4/12/2018    Richard M Goldberg   [address]  [place] [address]    Date and time of procedure: 04/27/2018 @ 11:30am  Arrival time: 08:30am    [facility]  [address] [place] [address]  Check in:  Come directly to [facility]; Do not stop at main admitting desk.   - Located on the first floor of the hospital. Take the elevators at the main entrance down to the first floor and follow the signs.   What to Bring:  - Insurance cards / photo I.D.  - List of all over-the-counter medications you take.   - Overnight bag with toiletries / necessities (no valuables)  Do not bring:  - valuables, including wallet, purse, jewelry or credit cards  - More than $20 cash  - Any medications unless instructed otherwise.  Transportation:  - For your safety, you will not be allowed to drive yourself home. Please make necessary arrangements for transportation with family or friends.   Instructions:  - Nothing to eat after midnight. You may have clear liquids until 0500, then nothing by mouth  - Do not take your hydrochlorothiazide the morning of the procedure.    Any questions, please don’t hesitate to call  Office of: Mason Edwards M.D.  [phone]  
Vital Signs

## 2020-02-13 NOTE — OB PROVIDER H&P - HISTORY OF PRESENT ILLNESS
Pt is a 25YO  @ 37.4wks presenting for scheduled rLTCS. PT has a history of cHTN. 24hr protein during pregnancy was 2990, P/C ratio was 3.0. Patient is siPEC. Patient presented today w/ a headache on admission and severe range BP. The BP subsided spontaneously. Tylenol will be given for the HA. Endorses good FM. Denies LOF, VB, Ctx. GBS neg. EFW 6#8    OBHx: pLTCS 2017- 6#6- Malpresentation- Post partum sPEC/Mg  GYN: Neg  PMH: cHTN  PSH: c/s, Appendectomy ()  All: NKDA  Meds; Labetalol 200 TID, Procardia 90QD, ASA, PNV, Fe  Psych: neg  Soc: neg  FHx: neg

## 2020-02-13 NOTE — OB PROVIDER DELIVERY SUMMARY - NSPROVIDERDELIVERYNOTE_OBGYN_ALL_OB_FT
Liveborn infant FM Apgars 8/8. Wt 5#3  QBL: 403  EBL: 800  IVF: 2500  UOP: 250  Uncomplicated rLTCS. Patient had uterine atony. Intrauterine methergine was given.

## 2020-02-13 NOTE — OB PROVIDER H&P - NSHPPHYSICALEXAM_GEN_ALL_CORE
Gen: NAD, A&Ox3  ABd: Soft, Gravid uterus, No guarding/rebound    NST: Reactive    Sono: Vtx, Anterior fundal placenta

## 2020-02-13 NOTE — PROVIDER CONTACT NOTE (OTHER) - BACKGROUND
c/s from 2/13 at 1215. currently on magnesium 50 and PIT 50. qbl 403. patient currently on labetalol and nifedipine.

## 2020-02-13 NOTE — PROVIDER CONTACT NOTE (OTHER) - ASSESSMENT
patient vomiting and c/o feeling weak and dizzy patient vomiting and c/o feeling weak and dizziness.

## 2020-02-13 NOTE — CHART NOTE - NSCHARTNOTEFT_GEN_A_CORE
Notified by RN that pt feeling lightheaded and nausea. Magnesium paused and STAT Mag level sent.   Mag level returned 6.4. Pt reportedly feeling improved after Magnesium pause.    Vital Signs Last 24 Hrs  T(C): 36.6 (13 Feb 2020 19:02), Max: 36.8 (13 Feb 2020 09:46)  T(F): 97.8 (13 Feb 2020 19:02), Max: 98.24 (13 Feb 2020 09:46)  HR: 77 (13 Feb 2020 19:02) (61 - 81)  BP: 151/77 (13 Feb 2020 19:02) (132/69 - 171/88)  BP(mean): 106 (13 Feb 2020 18:30) (83 - 110)  RR: 20 (13 Feb 2020 19:02) (14 - 24)  SpO2: 97% (13 Feb 2020 19:02) (97% - 99%)      POD#0 s/p rLTCS. Pregnancy c/b siPEC with severe features, currently on Magnesium for seizure ppx.   - BPs reviewed with Dr. eBcerra. As pt has h/o of cHTN, elevated BPs are likely pt baseline.   - Monitor BP  - C/w Procardia 90 XL and Labetalol 200 TID at this time.  - Restart Magnesium @ 1g/hr  - D/C Magnesium 2/14 @3PM  - C/w routine postop care    D/w Dr. Hernan hCa PGY-1 Notified by RN that pt feeling lightheaded and nausea. Magnesium paused and STAT Mag level sent.   Mag level returned 6.4. Pt reportedly feeling improved after Magnesium pause.    On evaluation, pt without complaints. Reports improvement in nausea/lightheadedness after Magnesium paused.  Denies HA, vision changes/floaters/flashing lights, RUQ or epigastric pain. Denies CP, SOB.     Vital Signs Last 24 Hrs  T(C): 36.6 (13 Feb 2020 19:02), Max: 36.8 (13 Feb 2020 09:46)  HR: 77 (13 Feb 2020 19:02) (61 - 81)  BP: 151/77 (13 Feb 2020 19:02) (132/69 - 171/88)  BP(mean): 106 (13 Feb 2020 18:30) (83 - 110)  RR: 20 (13 Feb 2020 19:02) (14 - 24)  SpO2: 97% (13 Feb 2020 19:02) (97% - 99%)    Normal S1/S2  CTAB  nontender  +Mccann, clear urine  SCDs in place, funcational  +3 BR reflex    POD#0 s/p rLTCS. Pregnancy c/b siPEC with severe features, currently on Magnesium for seizure ppx.   - BPs reviewed with Dr. Becerra. As pt has h/o of cHTN, elevated BPs are likely pt baseline.   - Monitor BP  - C/w Procardia 90 XL and Labetalol 200 TID at this time.  - Restart Magnesium @ 1g/hr  - D/C Magnesium 2/14 @3PM  - AM HELLP labs  - C/w routine postop care    D/w Dr. Hernan Cha PGY-1

## 2020-02-13 NOTE — OB RN PATIENT PROFILE - ALERT: PERTINENT HISTORY
Ultra Screen at 12 Weeks/1st Trimester Sonogram/20 Week Level II Sonogram/Fetal Non-Stress Test (NST)

## 2020-02-13 NOTE — DISCHARGE NOTE OB - MEDICATION SUMMARY - MEDICATIONS TO TAKE
I will START or STAY ON the medications listed below when I get home from the hospital:    acetaminophen 325 mg oral tablet  -- 3 tab(s) by mouth   -- Indication: For  section    ibuprofen 600 mg oral tablet  -- 1 tab(s) by mouth every 6 hours  -- Indication: For  section    labetalol 200 mg oral tablet  -- 1 tab(s) by mouth 3 times a day  -- Indication: For chronic hypertension     NIFEdipine 90 mg oral tablet, extended release  -- 1 tab(s) by mouth once a day  -- Indication: For chronic hypertension I will START or STAY ON the medications listed below when I get home from the hospital:    acetaminophen 325 mg oral tablet  -- 3 tab(s) by mouth , As Needed  -- Indication: For Pain    labetalol 200 mg oral tablet  -- 1 tab(s) by mouth 3 times a day  -- Indication: For chronic hypertension     NIFEdipine 90 mg oral tablet, extended release  -- 1 tab(s) by mouth once a day  -- Indication: For chronic hypertension

## 2020-02-13 NOTE — PROVIDER CONTACT NOTE (CHANGE IN STATUS NOTIFICATION) - SITUATION
Pt transferred to 5T. pt reports feeling dizzy, having blurry vision and N/V. magnesium on hold at this time

## 2020-02-13 NOTE — DISCHARGE NOTE OB - CARE PLAN
Principal Discharge DX:	Preeclampsia complicating hypertension  Goal:	normotension  Assessment and plan of treatment:	normal activity, regular diet. follow up in office in 1 week for B.P. check continue antihypertensives  Secondary Diagnosis:	History of  section  Goal:	full recovery  Assessment and plan of treatment:	as above Principal Discharge DX:	Preeclampsia complicating hypertension  Goal:	normotension  Assessment and plan of treatment:	normal activity, regular diet. follow up in office in 1 week for B.P. check continue antihypertensives    low potassium diet, as discussed with nephrology team  schedule appointment with nephrology   Dr. Tesha Benavides at [address] [place] [address]  Secondary Diagnosis:	History of  section  Goal:	full recovery  Assessment and plan of treatment:	as above

## 2020-02-13 NOTE — DISCHARGE NOTE OB - PROVIDER TOKENS
FREE:[LAST:[Darius REDDY],FIRST:[John],PHONE:[phone]],FAX:[phone]],ADDRESS:[[address] [place] [address]]] FREE:[LAST:[Darius REDDY],FIRST:[John],PHONE:[phone]],FAX:[phone]],ADDRESS:[[address] [place] [address]]],PROVIDER:[TOKEN:[[id number]]]

## 2020-02-13 NOTE — DISCHARGE NOTE OB - PLAN OF CARE
normotension normal activity, regular diet. follow up in office in 1 week for B.P. check continue antihypertensives full recovery as above normal activity, regular diet. follow up in office in 1 week for B.P. check continue antihypertensives    low potassium diet, as discussed with nephrology team  schedule appointment with nephrology   Dr. Tesha Benavides at [address] [place] [address]

## 2020-02-13 NOTE — PROVIDER CONTACT NOTE (OTHER) - SITUATION
patient transported at change of shift with JOSE Maurice. patient complaining of dizziness, nausea, body not feeling well and very weak. patient currently vomiting. patient transported to [address] [place] [address] at 1850 with JOSE Maurice. patient complaining of dizziness, nausea, body not feeling well and very weak. patient currently vomiting.

## 2020-02-13 NOTE — OB PROVIDER H&P - PROBLEM SELECTOR PLAN 1
-Admit to L&D, routine labs, IVF  -rLTCS  -EFM + [place]: Cat 1  -GBS: Neg  -EFW: 2900  -PNC: siPEC  -Meds: Labetalol 200TID, Procardia 90QD  -Baseline HELLP Labs  -Previous P/C: 3.0  -Anesthesia consult    Jayesh Yin PGY-2 d/w Dr. Mccoy

## 2020-02-13 NOTE — OB POSTPARTUM EVENT NOTE - NS_EVENTPTSUMMARY1_OBGYN_ALL_OB_FT
s/p c/s, /87, 154/87, HR 72, RR 20, SPO2 99%, 36.5 degrees celsius, no pain, fundus firm, at umbilicus, light bleeding, pitocin 125cc/hr, LR 75cc/hr  patient received methergine in OR as per MD Mccoy

## 2020-02-13 NOTE — OB POSTPARTUM EVENT NOTE - NS_EVENTFINDINGS1_OBGYN_ALL_OB_FT
As per MD Yin, will continue to monitor, will give labetolol PO as soon as patient able to drink fluids, will continue to monitor

## 2020-02-13 NOTE — DISCHARGE NOTE OB - CARE PROVIDER_API CALL
Darius REDDY, [address] [address]  Phone: [phone]  Fax: [phone]  Follow Up Time: Darius REDDY, [address] [address]  Phone: [phone]  Fax: [phone]  Follow Up Time:     Tesha Benavides)  Internal Medicine; Nephrology  [address] [place] [address] [address] [place] [address]  Phone: [phone]  Fax: [phone]  Follow Up Time:

## 2020-02-13 NOTE — DISCHARGE NOTE OB - MEDICATION SUMMARY - MEDICATIONS TO STOP TAKING
I will STOP taking the medications listed below when I get home from the hospital:    aspirin 81 mg oral tablet  -- 1 tab(s) by mouth once a day    Colace 100 mg oral capsule  -- 1 cap(s) by mouth 2 times a day    ferrous sulfate 325 mg (65 mg elemental iron) oral tablet  -- 1 tab(s) by mouth every other day I will STOP taking the medications listed below when I get home from the hospital:    NIFEdipine 90 mg oral tablet, extended release  -- 1 tab(s) by mouth once a day    aspirin 81 mg oral tablet  -- 1 tab(s) by mouth once a day    Colace 100 mg oral capsule  -- 1 cap(s) by mouth 2 times a day    ferrous sulfate 325 mg (65 mg elemental iron) oral tablet  -- 1 tab(s) by mouth every other day

## 2020-02-13 NOTE — OB NEONATOLOGY/PEDIATRICIAN DELIVERY SUMMARY - NSPEDSNEONOTESA_OBGYN_ALL_OB_FT
37.4 weeks gestation infant delivered to a 34 yr  B+ mother with pregnancy significant for superimposed preeclampsia. 37.4 weeks gestation infant delivered to a 34 yr  B+ mother with pregnancy significant for superimposed preeclampsia on labetolol, nifedipine and ASA and GDMA1. Prenatal labs : GBS neg ( 2/4 ),  HIV neg, RPR non-reactive, HBsAg neg, and Rubella immune. AROM at the time of delivery to clear fluid. Infant delivered cephalic with spontaneous cry and good tone but appeared cyanosed. Dried, stimulated and suctioned. Infant continued to appear dusky and blue. Pulseox at about 6 min of life in the low 70s. CPAP 5 21 % started and FiO2 increased to 30 % resulting in improvement of sats to mid to upper 90s. FiO2 titrated to 21 % and well tolerated. CPAP 5 given for 13 min and infant weaned to RA. Remained alert and active with no signs of respiratory distress and desaturations. Sats on room air  %. Mother plans to breast and bottle feeding and would like her infant to receive Hep B vaccine 37.4 weeks gestation infant delivered to a 34 yr  B+ mother with pregnancy significant for superimposed preeclampsia on labetolol, nifedipine and ASA and GDMA1. Prenatal labs : GBS neg ( 2/4 ),  HIV neg, RPR non-reactive, HBsAg neg, and Rubella immune. AROM at the time of delivery to clear fluid. Infant delivered cephalic with spontaneous cry and good tone but appeared cyanosed. Dried, stimulated and suctioned. Infant continued to appear dusky and blue. Pulseox at about 6 min of life in the low 70s. CPAP 5 21 % started and FiO2 increased to 30 % resulting in improvement of sats to mid to upper 90s. FiO2 titrated to 21 % and well tolerated. CPAP 5 given for 13 min and infant weaned to RA. Remained alert and active with no signs of respiratory distress and desaturations. Sats on room air  %. Mother plans to breast and bottle feed and would like her infant to receive Hep B vaccine

## 2020-02-14 ENCOUNTER — APPOINTMENT (OUTPATIENT)
Dept: ANTEPARTUM | Facility: CLINIC | Age: 35
End: 2020-02-14

## 2020-02-14 ENCOUNTER — APPOINTMENT (OUTPATIENT)
Dept: ANTEPARTUM | Facility: HOSPITAL | Age: 35
End: 2020-02-14

## 2020-02-14 LAB
ALBUMIN SERPL ELPH-MCNC: 2.5 G/DL — LOW (ref 3.3–5)
ALP SERPL-CCNC: 92 U/L — SIGNIFICANT CHANGE UP (ref 40–120)
ALT FLD-CCNC: 9 U/L — SIGNIFICANT CHANGE UP (ref 4–33)
ANION GAP SERPL CALC-SCNC: 11 MMO/L — SIGNIFICANT CHANGE UP (ref 7–14)
ANION GAP SERPL CALC-SCNC: 12 MMO/L — SIGNIFICANT CHANGE UP (ref 7–14)
APTT BLD: 25.4 SEC — LOW (ref 27.5–36.3)
AST SERPL-CCNC: 19 U/L — SIGNIFICANT CHANGE UP (ref 4–32)
BASOPHILS # BLD AUTO: 0.04 K/UL — SIGNIFICANT CHANGE UP (ref 0–0.2)
BASOPHILS NFR BLD AUTO: 0.3 % — SIGNIFICANT CHANGE UP (ref 0–2)
BILIRUB SERPL-MCNC: 0.2 MG/DL — SIGNIFICANT CHANGE UP (ref 0.2–1.2)
BUN SERPL-MCNC: 32 MG/DL — HIGH (ref 7–23)
BUN SERPL-MCNC: 39 MG/DL — HIGH (ref 7–23)
CALCIUM SERPL-MCNC: 8.5 MG/DL — SIGNIFICANT CHANGE UP (ref 8.4–10.5)
CALCIUM SERPL-MCNC: 8.9 MG/DL — SIGNIFICANT CHANGE UP (ref 8.4–10.5)
CHLORIDE SERPL-SCNC: 105 MMOL/L — SIGNIFICANT CHANGE UP (ref 98–107)
CHLORIDE SERPL-SCNC: 106 MMOL/L — SIGNIFICANT CHANGE UP (ref 98–107)
CHLORIDE UR-SCNC: 24 MMOL/L — SIGNIFICANT CHANGE UP
CO2 SERPL-SCNC: 16 MMOL/L — LOW (ref 22–31)
CO2 SERPL-SCNC: 16 MMOL/L — LOW (ref 22–31)
CREAT SERPL-MCNC: 2.27 MG/DL — HIGH (ref 0.5–1.3)
CREAT SERPL-MCNC: 2.55 MG/DL — HIGH (ref 0.5–1.3)
EOSINOPHIL # BLD AUTO: 0.33 K/UL — SIGNIFICANT CHANGE UP (ref 0–0.5)
EOSINOPHIL NFR BLD AUTO: 2.4 % — SIGNIFICANT CHANGE UP (ref 0–6)
FIBRINOGEN PPP-MCNC: > 724 MG/DL — HIGH (ref 350–510)
GLUCOSE SERPL-MCNC: 124 MG/DL — HIGH (ref 70–99)
GLUCOSE SERPL-MCNC: 97 MG/DL — SIGNIFICANT CHANGE UP (ref 70–99)
HCT VFR BLD CALC: 29.2 % — LOW (ref 34.5–45)
HGB BLD-MCNC: 9.5 G/DL — LOW (ref 11.5–15.5)
IMM GRANULOCYTES NFR BLD AUTO: 1.4 % — SIGNIFICANT CHANGE UP (ref 0–1.5)
INR BLD: 0.84 — LOW (ref 0.88–1.17)
LDH SERPL L TO P-CCNC: 244 U/L — HIGH (ref 135–225)
LYMPHOCYTES # BLD AUTO: 0.98 K/UL — LOW (ref 1–3.3)
LYMPHOCYTES # BLD AUTO: 7.1 % — LOW (ref 13–44)
MANUAL SMEAR VERIFICATION: SIGNIFICANT CHANGE UP
MCHC RBC-ENTMCNC: 29.6 PG — SIGNIFICANT CHANGE UP (ref 27–34)
MCHC RBC-ENTMCNC: 32.5 % — SIGNIFICANT CHANGE UP (ref 32–36)
MCV RBC AUTO: 91 FL — SIGNIFICANT CHANGE UP (ref 80–100)
MONOCYTES # BLD AUTO: 0.79 K/UL — SIGNIFICANT CHANGE UP (ref 0–0.9)
MONOCYTES NFR BLD AUTO: 5.8 % — SIGNIFICANT CHANGE UP (ref 2–14)
NEUTROPHILS # BLD AUTO: 11.39 K/UL — HIGH (ref 1.8–7.4)
NEUTROPHILS NFR BLD AUTO: 83 % — HIGH (ref 43–77)
NRBC # FLD: 0 K/UL — SIGNIFICANT CHANGE UP (ref 0–0)
OSMOLALITY UR: 154 MOSMO/KG — SIGNIFICANT CHANGE UP (ref 50–1200)
PLATELET # BLD AUTO: 226 K/UL — SIGNIFICANT CHANGE UP (ref 150–400)
PMV BLD: 11.9 FL — SIGNIFICANT CHANGE UP (ref 7–13)
POTASSIUM SERPL-MCNC: 4.9 MMOL/L — SIGNIFICANT CHANGE UP (ref 3.5–5.3)
POTASSIUM SERPL-MCNC: 4.9 MMOL/L — SIGNIFICANT CHANGE UP (ref 3.5–5.3)
POTASSIUM SERPL-SCNC: 4.9 MMOL/L — SIGNIFICANT CHANGE UP (ref 3.5–5.3)
POTASSIUM SERPL-SCNC: 4.9 MMOL/L — SIGNIFICANT CHANGE UP (ref 3.5–5.3)
POTASSIUM UR-SCNC: 10.4 MMOL/L — SIGNIFICANT CHANGE UP
PROT SERPL-MCNC: 5.8 G/DL — LOW (ref 6–8.3)
PROTHROM AB SERPL-ACNC: 9.5 SEC — LOW (ref 9.8–13.1)
RBC # BLD: 3.21 M/UL — LOW (ref 3.8–5.2)
RBC # FLD: 14.4 % — SIGNIFICANT CHANGE UP (ref 10.3–14.5)
SODIUM SERPL-SCNC: 133 MMOL/L — LOW (ref 135–145)
SODIUM SERPL-SCNC: 133 MMOL/L — LOW (ref 135–145)
SODIUM UR-SCNC: 28 MMOL/L — SIGNIFICANT CHANGE UP
URATE SERPL-MCNC: 8.9 MG/DL — HIGH (ref 2.5–7)
WBC # BLD: 13.72 K/UL — HIGH (ref 3.8–10.5)
WBC # FLD AUTO: 13.72 K/UL — HIGH (ref 3.8–10.5)

## 2020-02-14 RX ORDER — LEVETIRACETAM 250 MG/1
500 TABLET, FILM COATED ORAL ONCE
Refills: 0 | Status: COMPLETED | OUTPATIENT
Start: 2020-02-14 | End: 2020-02-14

## 2020-02-14 RX ORDER — SODIUM CHLORIDE 9 MG/ML
1000 INJECTION INTRAMUSCULAR; INTRAVENOUS; SUBCUTANEOUS
Refills: 0 | Status: DISCONTINUED | OUTPATIENT
Start: 2020-02-14 | End: 2020-02-14

## 2020-02-14 RX ORDER — SODIUM CHLORIDE 9 MG/ML
1000 INJECTION, SOLUTION INTRAVENOUS
Refills: 0 | Status: DISCONTINUED | OUTPATIENT
Start: 2020-02-14 | End: 2020-02-15

## 2020-02-14 RX ORDER — SODIUM CHLORIDE 0.65 %
1 AEROSOL, SPRAY (ML) NASAL EVERY 6 HOURS
Refills: 0 | Status: DISCONTINUED | OUTPATIENT
Start: 2020-02-14 | End: 2020-02-17

## 2020-02-14 RX ADMIN — Medication 975 MILLIGRAM(S): at 16:24

## 2020-02-14 RX ADMIN — Medication 975 MILLIGRAM(S): at 16:55

## 2020-02-14 RX ADMIN — Medication 90 MILLIGRAM(S): at 23:41

## 2020-02-14 RX ADMIN — Medication 200 MILLIGRAM(S): at 05:47

## 2020-02-14 RX ADMIN — MAGNESIUM HYDROXIDE 30 MILLILITER(S): 400 TABLET, CHEWABLE ORAL at 21:22

## 2020-02-14 RX ADMIN — Medication 200 MILLIGRAM(S): at 23:41

## 2020-02-14 RX ADMIN — Medication 1 SPRAY(S): at 23:41

## 2020-02-14 RX ADMIN — HEPARIN SODIUM 5000 UNIT(S): 5000 INJECTION INTRAVENOUS; SUBCUTANEOUS at 23:41

## 2020-02-14 RX ADMIN — SODIUM CHLORIDE 125 MILLILITER(S): 9 INJECTION, SOLUTION INTRAVENOUS at 21:18

## 2020-02-14 RX ADMIN — LEVETIRACETAM 500 MILLIGRAM(S): 250 TABLET, FILM COATED ORAL at 10:41

## 2020-02-14 RX ADMIN — Medication 975 MILLIGRAM(S): at 22:00

## 2020-02-14 RX ADMIN — Medication 975 MILLIGRAM(S): at 21:18

## 2020-02-14 RX ADMIN — SIMETHICONE 80 MILLIGRAM(S): 80 TABLET, CHEWABLE ORAL at 10:40

## 2020-02-14 RX ADMIN — HEPARIN SODIUM 5000 UNIT(S): 5000 INJECTION INTRAVENOUS; SUBCUTANEOUS at 10:39

## 2020-02-14 RX ADMIN — Medication 200 MILLIGRAM(S): at 14:45

## 2020-02-14 RX ADMIN — Medication 30 MILLIGRAM(S): at 05:46

## 2020-02-14 RX ADMIN — SIMETHICONE 80 MILLIGRAM(S): 80 TABLET, CHEWABLE ORAL at 14:46

## 2020-02-14 RX ADMIN — Medication 30 MILLIGRAM(S): at 06:24

## 2020-02-14 NOTE — PROGRESS NOTE ADULT - SUBJECTIVE AND OBJECTIVE BOX
POST OP DAY  1  s/p   SECTION    SUBJECTIVE:  I'm ok.    PAIN SCALE SCORE: [x] Refer to charted pain scores    THERAPY:  [X] Spinal morphine   [  ] Epidural morphine   [  ] IV PCA Hydromorphone 1 mg/ml    OBJECTIVE:  Comfortable Appearing    SEDATION SCORE:	  [ x ] Alert	    [  ] Drowsy        [  ] Arousable	[  ] Asleep	[  ] Unresponsive    Side Effects:	  [ x ] None	     [  ] Nausea        [  ] Pruritus        [  ] Weakness   [  ] Numbness        ASSESSMENT/ PLAN   [   ] Discontinue         [  ] Continue    [ x ] Change to prn Analgesics as per primary service.    DOCUMENTATION & VERIFICATION OF CURRENT MEDS [ x ] Done    COMMENTS: No Headache.

## 2020-02-14 NOTE — PROGRESS NOTE ADULT - SUBJECTIVE AND OBJECTIVE BOX
OB Progress Note:  Delivery, POD#1    S: 33yo POD#1 s/p LTCS, sPEC. Last night patient felt dizzy on Magnesium. Labwork showed elevated creatinine over 2; Mg was d/c'd, the patient was started on Keppra for seizure prophylaxis. She feels better, denies any dizziness, lightheadedness, headaches, visual changes, epigastric pain. Her pain is well controlled. She is tolerating a regular diet and passing flatus. Denies N/V. Denies CP/SOB/lightheadedness/dizziness.   She is ambulating without difficulty.   Mccann in place with adequate urine output.   Denies heavy vaginal bleeding.     O:   Vital Signs Last 24 Hrs  T(C): 36.6 (2020 05:28), Max: 36.8 (2020 09:46)  T(F): 97.9 (2020 05:28), Max: 98.24 (2020 09:46)  HR: 64 (2020 00:13) (61 - 81)  BP: 121/78 (2020 05:28) (121/78 - 171/88)  BP(mean): 106 (2020 18:30) (83 - 110)  RR: 17 (2020 05:28) (14 - 24)  SpO2: 97% (2020 05:28) (97% - 99%)    Labs:  Blood type: B Positive  Rubella IgG: RPR: Negative                          9.8<L>   14.63<H> >-----------< 232    (  @ 21:55 )             29.7<L>                        10.3<L>   10.62<H> >-----------< 249    (  @ 09:39 )             32.9<L>    20 @ 21:55      135  |  109<H>  |  33<H>  ----------------------------<  85  4.5   |  16<L>  |  2.17<H>    20 @ 09:39      135  |  108<H>  |  36<H>  ----------------------------<  89  4.7   |  15<L>  |  2.20<H>                PE:  General: NAD  Abdomen: Mildly distended, appropriately tender, incision c/d/i.  Extremities: No erythema, no pitting edema

## 2020-02-14 NOTE — PROGRESS NOTE ADULT - PROBLEM SELECTOR PLAN 1
- Mccann in place; monitor urine output  - F/u AM HELLP labs   - Continue regular diet.  - Increase ambulation; HSQ and SCDs for DVT prophylaxis   - Continue motrin, tylenol, oxycodone PRN for pain control.     Hermelindo Badillo PGY1

## 2020-02-14 NOTE — PROGRESS NOTE ADULT - SUBJECTIVE AND OBJECTIVE BOX
ANESTHESIA POSTOP CHECK    34y Female POSTOP DAY 1 S/P     Vital Signs Last 24 Hrs  T(C): 36.6 (2020 05:28), Max: 36.8 (2020 09:46)  T(F): 97.9 (2020 05:28), Max: 98.24 (2020 09:46)  HR: 82 (2020 05:28) (61 - 82)  BP: 121/78 (2020 05:28) (121/78 - 171/88)  BP(mean): 106 (2020 18:30) (83 - 110)  RR: 17 (2020 05:28) (14 - 24)  SpO2: 97% (2020 05:28) (97% - 99%)  I&O's Summary    2020 07:01  -  2020 07:00  --------------------------------------------------------  IN: 4825 mL / OUT: 2723 mL / NET: 2102 mL        [X ] NO APPARENT ANESTHESIA COMPLICATIONS      Comments:

## 2020-02-14 NOTE — PROGRESS NOTE ADULT - ASSESSMENT
35yo POD#1 s/p LTCS, sPEC transitioned from Mg to Keppra for impaired renal function.  Patient stable, showing no signs of sPEC or Mg toxicity.

## 2020-02-15 DIAGNOSIS — I10 ESSENTIAL (PRIMARY) HYPERTENSION: ICD-10-CM

## 2020-02-15 DIAGNOSIS — R80.9 PROTEINURIA, UNSPECIFIED: ICD-10-CM

## 2020-02-15 DIAGNOSIS — E87.2 ACIDOSIS: ICD-10-CM

## 2020-02-15 DIAGNOSIS — N17.9 ACUTE KIDNEY FAILURE, UNSPECIFIED: ICD-10-CM

## 2020-02-15 LAB
ANION GAP SERPL CALC-SCNC: 12 MMO/L — SIGNIFICANT CHANGE UP (ref 7–14)
BUN SERPL-MCNC: 41 MG/DL — HIGH (ref 7–23)
CALCIUM SERPL-MCNC: 8.5 MG/DL — SIGNIFICANT CHANGE UP (ref 8.4–10.5)
CHLORIDE SERPL-SCNC: 106 MMOL/L — SIGNIFICANT CHANGE UP (ref 98–107)
CO2 SERPL-SCNC: 17 MMOL/L — LOW (ref 22–31)
CREAT SERPL-MCNC: 2.56 MG/DL — HIGH (ref 0.5–1.3)
GLUCOSE SERPL-MCNC: 115 MG/DL — HIGH (ref 70–99)
MAGNESIUM SERPL-MCNC: 3.4 MG/DL — HIGH (ref 1.6–2.6)
PHOSPHATE SERPL-MCNC: 4.8 MG/DL — HIGH (ref 2.5–4.5)
POTASSIUM SERPL-MCNC: 4.5 MMOL/L — SIGNIFICANT CHANGE UP (ref 3.5–5.3)
POTASSIUM SERPL-SCNC: 4.5 MMOL/L — SIGNIFICANT CHANGE UP (ref 3.5–5.3)
SODIUM SERPL-SCNC: 135 MMOL/L — SIGNIFICANT CHANGE UP (ref 135–145)

## 2020-02-15 PROCEDURE — 99223 1ST HOSP IP/OBS HIGH 75: CPT | Mod: GC

## 2020-02-15 PROCEDURE — 76770 US EXAM ABDO BACK WALL COMP: CPT | Mod: 26

## 2020-02-15 RX ORDER — OXYCODONE HYDROCHLORIDE 5 MG/1
5 TABLET ORAL
Refills: 0 | Status: DISCONTINUED | OUTPATIENT
Start: 2020-02-15 | End: 2020-02-17

## 2020-02-15 RX ORDER — SODIUM CHLORIDE 9 MG/ML
1000 INJECTION, SOLUTION INTRAVENOUS
Refills: 0 | Status: DISCONTINUED | OUTPATIENT
Start: 2020-02-15 | End: 2020-02-16

## 2020-02-15 RX ADMIN — Medication 200 MILLIGRAM(S): at 06:40

## 2020-02-15 RX ADMIN — Medication 975 MILLIGRAM(S): at 23:50

## 2020-02-15 RX ADMIN — Medication 975 MILLIGRAM(S): at 01:45

## 2020-02-15 RX ADMIN — Medication 975 MILLIGRAM(S): at 17:14

## 2020-02-15 RX ADMIN — Medication 975 MILLIGRAM(S): at 18:00

## 2020-02-15 RX ADMIN — SIMETHICONE 80 MILLIGRAM(S): 80 TABLET, CHEWABLE ORAL at 14:51

## 2020-02-15 RX ADMIN — Medication 975 MILLIGRAM(S): at 11:30

## 2020-02-15 RX ADMIN — Medication 90 MILLIGRAM(S): at 23:50

## 2020-02-15 RX ADMIN — OXYCODONE HYDROCHLORIDE 5 MILLIGRAM(S): 5 TABLET ORAL at 19:45

## 2020-02-15 RX ADMIN — Medication 975 MILLIGRAM(S): at 01:12

## 2020-02-15 RX ADMIN — OXYCODONE HYDROCHLORIDE 5 MILLIGRAM(S): 5 TABLET ORAL at 19:01

## 2020-02-15 RX ADMIN — Medication 200 MILLIGRAM(S): at 14:51

## 2020-02-15 RX ADMIN — SIMETHICONE 80 MILLIGRAM(S): 80 TABLET, CHEWABLE ORAL at 19:01

## 2020-02-15 RX ADMIN — HEPARIN SODIUM 5000 UNIT(S): 5000 INJECTION INTRAVENOUS; SUBCUTANEOUS at 10:45

## 2020-02-15 RX ADMIN — HEPARIN SODIUM 5000 UNIT(S): 5000 INJECTION INTRAVENOUS; SUBCUTANEOUS at 23:19

## 2020-02-15 RX ADMIN — Medication 200 MILLIGRAM(S): at 23:19

## 2020-02-15 RX ADMIN — Medication 975 MILLIGRAM(S): at 10:44

## 2020-02-15 NOTE — CONSULT NOTE ADULT - PROBLEM SELECTOR RECOMMENDATION 2
Patient with proteinuria, ~5g on this admission. Would send serological workup for nephrotic range proteinuria including PLA2R level, C3/C4, Hep B/C Panel, Cryoglobulins, C3/C4, ASHLEY, Anti-Sm, Anti-dsDNA. This can also be pre-eclampsia as well given sudden rise in proteinuria for this patient that has never been in the nephrotic range. Continue to control BP, monitor for hemolysis. Check Lipid Panel as well. Patient with proteinuria, ~5g on this admission. Would send serological workup for nephrotic range proteinuria including PLA2R level, C3/C4, Hep B/C Panel, Cryoglobulins, C3/C4, ASHLEY, Anti-Sm, Anti-dsDNA. This can also be pre-eclampsia as well given sudden rise in proteinuria for this patient that has never been in the nephrotic range. Continue to control BP, monitor for hemolysis. Check Lipid Panel as well. Likely would be focal sclerosis however may require biopsy in the future and needs close monitoring. Should follow-up with Dr. Tesha Benavides at [address] [place] [address] after discharge.

## 2020-02-15 NOTE — PROGRESS NOTE ADULT - SUBJECTIVE AND OBJECTIVE BOX
OB Progress Note: LTCS, POD#2    S: 33yo POD#2 s/p LTCS, superimposed sPEC s/p Keppra for seizure prophylaxis due to an elevated creatinine. Creatinine is elevated to 2.55; nephrology was consulted  and recommended IVF and renal ultrasound; to see patient today. Otherwise patient is well; denies headaches, visual changes, epigastric pain. Pain is well controlled. She is tolerating a regular diet and passing flatus. She is voiding spontaneously, and ambulating without difficulty. Denies CP/SOB. Denies lightheadedness/dizziness. Denies N/V. Denies heavy vaginal bleeding.     O:  Vitals:  Vital Signs Last 24 Hrs  T(C): 36.9 (15 Feb 2020 01:50), Max: 37 (14 Feb 2020 16:25)  T(F): 98.5 (15 Feb 2020 01:50), Max: 98.6 (14 Feb 2020 16:25)  HR: 81 (15 Feb 2020 01:50) (76 - 85)  BP: 116/73 (15 Feb 2020 01:50) (108/57 - 121/78)  BP(mean): --  RR: 17 (15 Feb 2020 01:50) (16 - 18)  SpO2: 97% (15 Feb 2020 01:50) (96% - 100%)    MEDICATIONS  (STANDING):  acetaminophen   Tablet .. 975 milliGRAM(s) Oral <User Schedule>  diphtheria/tetanus/pertussis (acellular) Vaccine (ADAcel) 0.5 milliLiter(s) IntraMuscular once  heparin  Injectable 5000 Unit(s) SubCutaneous every 12 hours  labetalol 200 milliGRAM(s) Oral three times a day  lactated ringers. 1000 milliLiter(s) (125 mL/Hr) IV Continuous <Continuous>  NIFEdipine XL 90 milliGRAM(s) Oral daily      MEDICATIONS  (PRN):  diphenhydrAMINE 25 milliGRAM(s) Oral every 6 hours PRN Itching  glycerin Suppository - Adult 1 Suppository(s) Rectal at bedtime PRN Constipation  lanolin Ointment 1 Application(s) Topical every 6 hours PRN Sore Nipples  magnesium hydroxide Suspension 30 milliLiter(s) Oral two times a day PRN Constipation  oxyCODONE    IR 5 milliGRAM(s) Oral every 3 hours PRN Moderate to Severe Pain (4-10)  oxyCODONE    IR 5 milliGRAM(s) Oral once PRN Moderate to Severe Pain (4-10)  simethicone 80 milliGRAM(s) Chew every 4 hours PRN Gas  sodium chloride 0.65% Nasal 1 Spray(s) Both Nostrils every 6 hours PRN Nasal Congestion      Labs:  Blood type: B Positive  Rubella IgG: RPR: Negative                          9.5<L>   13.72<H> >-----------< 226    ( 02-14 @ 05:48 )             29.2<L>                        9.8<L>   14.63<H> >-----------< 232    ( 02-13 @ 21:55 )             29.7<L>                        10.3<L>   10.62<H> >-----------< 249    ( 02-13 @ 09:39 )             32.9<L>          PE:  General: NAD  Abdomen: Soft, appropriately tender, incision c/d/i.  Extremities: No erythema, no pitting edema

## 2020-02-15 NOTE — PROGRESS NOTE ADULT - PROBLEM SELECTOR PLAN 1
- Continue with procardia 90 qd and labetalol 200 tid   - F/u Renal ultrasound   - AM BMP  - Continue with IVF  - Continue regular diet.  - Increase ambulation; HSQ and SCDs   - Continue motrin, tylenol, oxycodone PRN for pain control      Hermelindo Badillo PGY1

## 2020-02-15 NOTE — CONSULT NOTE ADULT - ATTENDING COMMENTS
Patient with a component of KAYLA in context of underlying CKD.  Proteinuria currently above previous levels, per  and patient.  Evaluation as noted above.  Monitoring anemia, hypertension and acidosis.  Will need outpatient monitoring upon discharge.  Will continue to monitor with you while she remains hospitalized.

## 2020-02-15 NOTE — CONSULT NOTE ADULT - SUBJECTIVE AND OBJECTIVE BOX
[facility] Division of Kidney Diseases & Hypertension  INITIAL CONSULT NOTE  [phone]--------------------------------------------------------------------------------  HPI: 34 year old female with HTN, proteinuria, previous history of  section who presented to the hospital for scheduled  now with worsening proteinuria and KAYLA for which nephrology is consulted. Patient initially followed with Dr. Sims as outpatient and has been noted to have mildly elevated renal function with creatinine of 1.5-1.8 in the last 2 years with proteinuria however has been around 1-2 grams. Patient's  states that she had close to no proteinuria up to ~1g between first and second pregnancy. Prior to the first pregnancy patient had not seen a physician and did not know of any kidney issues. During second pregnancy patient also had proteinuria up to ~2g and was hypertensive as well however was chronic hypertension. Patient had uneventful second pregnancy and underwent  on . Nephrology now consulted for management of KAYLA and proteinuria.      PAST HISTORY  --------------------------------------------------------------------------------  PAST MEDICAL & SURGICAL HISTORY:  Pregnancy  HTN (hypertension)  History of  section  Status post appendectomy    FAMILY HISTORY:  Asthma (Mother)  Family history of heart attack (Father)  Hypertension (Father)  Diabetes mellitus (Father)    PAST SOCIAL HISTORY: Lives with  and son. No alcohol use/smoking noted.    ALLERGIES & MEDICATIONS  --------------------------------------------------------------------------------  Allergies    No Known Allergies    Intolerances      Standing Inpatient Medications  acetaminophen   Tablet .. 975 milliGRAM(s) Oral <User Schedule>  diphtheria/tetanus/pertussis (acellular) Vaccine (ADAcel) 0.5 milliLiter(s) IntraMuscular once  heparin  Injectable 5000 Unit(s) SubCutaneous every 12 hours  labetalol 200 milliGRAM(s) Oral three times a day  lactated ringers. 1000 milliLiter(s) IV Continuous <Continuous>  NIFEdipine XL 90 milliGRAM(s) Oral daily    PRN Inpatient Medications  diphenhydrAMINE 25 milliGRAM(s) Oral every 6 hours PRN  glycerin Suppository - Adult 1 Suppository(s) Rectal at bedtime PRN  lanolin Ointment 1 Application(s) Topical every 6 hours PRN  magnesium hydroxide Suspension 30 milliLiter(s) Oral two times a day PRN  oxyCODONE    IR 5 milliGRAM(s) Oral every 3 hours PRN  oxyCODONE    IR 5 milliGRAM(s) Oral once PRN  simethicone 80 milliGRAM(s) Chew every 4 hours PRN  sodium chloride 0.65% Nasal 1 Spray(s) Both Nostrils every 6 hours PRN      REVIEW OF SYSTEMS  --------------------------------------------------------------------------------  Gen: No  fevers/chills, weakness  Skin: No rashes  Head/Eyes/Ears/Mouth: No headache; Normal hearing; Normal vision w/o blurriness;   Respiratory: No dyspnea, cough, wheezing, hemoptysis  CV: No chest pain  GI: No abdominal pain, diarrhea, constipation, nausea, vomiting  : No increased frequency, dysuria, hematuria, nocturia  MSK: No joint pain/swelling;   Neuro: No dizziness/lightheadedness, weakness, seizures  Psych: No issues with mood/affect.    All other systems were reviewed and are negative, except as noted.    VITALS/PHYSICAL EXAM  --------------------------------------------------------------------------------  T(C): 36.9 (02-15-20 @ 10:42), Max: 37 (20 @ 16:25)  HR: 79 (02-15-20 @ 10:42) (77 - 85)  BP: 122/66 (02-15-20 @ 10:42) (108/57 - 122/69)  RR: 19 (02-15-20 @ 10:42) (16 - 19)  SpO2: 100% (02-15-20 @ 10:42) (96% - 100%)  Wt(kg): --        20 @ 07:01  -  02-15-20 @ 07:00  --------------------------------------------------------  IN: 900 mL / OUT: 3800 mL / NET: -2900 mL    02-15-20 @ 07:01  -  02-15-20 @ 12:51  --------------------------------------------------------  IN: 630 mL / OUT: 600 mL / NET: 30 mL      Physical Exam:  	Gen: NAD, well-appearing  	HEENT: PERRL, supple neck              Lymph: No lymph nodes palpated  	Pulm: CTA B/L  	CV:  S1S2  	Abd: +BS, soft   	Ext: No B/L Lower ext edema  	Neuro: No focal deficits  	Skin: Warm, without rashes              Psych: Non-focal  	Vascular: No cyanosis     LABS/STUDIES  --------------------------------------------------------------------------------              9.5    13.72 >-----------<  226      [20 05:48]              29.2     135  |  106  |  41  ----------------------------<  115      [02-15-20 @ 06:00]  4.5   |  17  |  2.56        Ca     8.5     [02-15-20 @ 06:00]      Mg     3.4     [02-15-20 @ 06:00]      Phos  4.8     [02-15-20 @ 06:00]    TPro  5.8  /  Alb  2.5  /  TBili  0.2  /  DBili  x   /  AST  19  /  ALT  9   /  AlkPhos  92  [20 05:48]    PT/INR: PT 9.5  , INR 0.84       [20 05:48]  PTT: 25.4       [20 05:48]    Uric acid 8.9      [20 05:48]        [20 05:48]    Creatinine Trend:  SCr 2.56 [02-15 @ 06:00]  SCr 2.55 [ 18:15]  SCr 2.27 [:48]  SCr 2.17 [ 21:55]  SCr 2.20 [ 09:39]    Urinalysis - [20 12:00]      Color LIGHT YELLOW / Appearance CLEAR / SG 1.010 / pH 6.5      Gluc NEGATIVE / Ketone NEGATIVE  / Bili NEGATIVE / Urobili NORMAL       Blood SMALL / Protein 200 / Leuk Est NEGATIVE / Nitrite NEGATIVE      RBC 3-5 / WBC 0-2 / Hyaline NEGATIVE / Gran  / Sq Epi OCC / Non Sq Epi  / Bacteria NEGATIVE    Urine Creatinine 42.70      [20 12:00]  Urine Protein 198.9      [20 12:00]  Urine Sodium 28      [20 20:30]  Urine Potassium 10.4      [20 20:30]  Urine Chloride 24      [20 20:30]  Urine Osmolality 154      [20 20:30]

## 2020-02-15 NOTE — CONSULT NOTE ADULT - PROBLEM SELECTOR RECOMMENDATION 4
Patient with acidosis, CO2 now 17. Okay to continue on Lactated Ringers for now and monitor levels closely.

## 2020-02-15 NOTE — CONSULT NOTE ADULT - PROBLEM SELECTOR RECOMMENDATION 9
Patient with baseline creatinine of 1.5-1.8 now presented to the hospital with creatinine ~2.0 now up to 2.5 post  delivery. This acute rise is likely related to post surgical blood loss, lack of PO intake and increased load on kidney from delivery resulting in a pre-renal state. For now will encorage PO intake, encourage fluid intake. Continue to control BP and avoid hypotension. Okay to continue fluids as well and monitor on current regimen.

## 2020-02-15 NOTE — PROGRESS NOTE ADULT - ASSESSMENT
33yo POD#2 s/p LTCS, superimposed sPEC with an up-trending creatinine. Urine output adequate, patient otherwise well postoperatively.

## 2020-02-15 NOTE — CONSULT NOTE ADULT - ASSESSMENT
34 year old female with HTN, proteinuria, previous history of  section who presented to the hospital for scheduled  now with worsening proteinuria and KAYLA for which nephrology is consulted.

## 2020-02-16 LAB
ANION GAP SERPL CALC-SCNC: 12 MMO/L — SIGNIFICANT CHANGE UP (ref 7–14)
BUN SERPL-MCNC: 41 MG/DL — HIGH (ref 7–23)
C3 SERPL-MCNC: 144 MG/DL — SIGNIFICANT CHANGE UP (ref 90–180)
C4 SERPL-MCNC: 24.4 MG/DL — SIGNIFICANT CHANGE UP (ref 10–40)
CALCIUM SERPL-MCNC: 8.3 MG/DL — LOW (ref 8.4–10.5)
CHLORIDE SERPL-SCNC: 107 MMOL/L — SIGNIFICANT CHANGE UP (ref 98–107)
CHOLEST SERPL-MCNC: 250 MG/DL — HIGH (ref 120–199)
CO2 SERPL-SCNC: 17 MMOL/L — LOW (ref 22–31)
CREAT SERPL-MCNC: 2.28 MG/DL — HIGH (ref 0.5–1.3)
GLUCOSE SERPL-MCNC: 82 MG/DL — SIGNIFICANT CHANGE UP (ref 70–99)
HBV CORE IGM SER-ACNC: NONREACTIVE — SIGNIFICANT CHANGE UP
HBV SURFACE AG SER-ACNC: NEGATIVE — SIGNIFICANT CHANGE UP
HDLC SERPL-MCNC: 53 MG/DL — SIGNIFICANT CHANGE UP (ref 45–65)
POTASSIUM SERPL-MCNC: 5.3 MMOL/L — SIGNIFICANT CHANGE UP (ref 3.5–5.3)
POTASSIUM SERPL-SCNC: 5.3 MMOL/L — SIGNIFICANT CHANGE UP (ref 3.5–5.3)
SODIUM SERPL-SCNC: 136 MMOL/L — SIGNIFICANT CHANGE UP (ref 135–145)
TRIGL SERPL-MCNC: 252 MG/DL — HIGH (ref 10–149)

## 2020-02-16 PROCEDURE — 99232 SBSQ HOSP IP/OBS MODERATE 35: CPT | Mod: GC

## 2020-02-16 RX ORDER — SODIUM CHLORIDE 9 MG/ML
1000 INJECTION INTRAMUSCULAR; INTRAVENOUS; SUBCUTANEOUS
Refills: 0 | Status: DISCONTINUED | OUTPATIENT
Start: 2020-02-16 | End: 2020-02-17

## 2020-02-16 RX ORDER — CALCIUM CARBONATE 500(1250)
1 TABLET ORAL ONCE
Refills: 0 | Status: COMPLETED | OUTPATIENT
Start: 2020-02-16 | End: 2020-02-16

## 2020-02-16 RX ORDER — SENNA PLUS 8.6 MG/1
2 TABLET ORAL AT BEDTIME
Refills: 0 | Status: DISCONTINUED | OUTPATIENT
Start: 2020-02-16 | End: 2020-02-17

## 2020-02-16 RX ADMIN — HEPARIN SODIUM 5000 UNIT(S): 5000 INJECTION INTRAVENOUS; SUBCUTANEOUS at 23:12

## 2020-02-16 RX ADMIN — SIMETHICONE 80 MILLIGRAM(S): 80 TABLET, CHEWABLE ORAL at 20:27

## 2020-02-16 RX ADMIN — Medication 975 MILLIGRAM(S): at 08:34

## 2020-02-16 RX ADMIN — SENNA PLUS 2 TABLET(S): 8.6 TABLET ORAL at 20:27

## 2020-02-16 RX ADMIN — SIMETHICONE 80 MILLIGRAM(S): 80 TABLET, CHEWABLE ORAL at 07:44

## 2020-02-16 RX ADMIN — Medication 200 MILLIGRAM(S): at 15:01

## 2020-02-16 RX ADMIN — Medication 975 MILLIGRAM(S): at 15:01

## 2020-02-16 RX ADMIN — Medication 975 MILLIGRAM(S): at 09:30

## 2020-02-16 RX ADMIN — Medication 90 MILLIGRAM(S): at 23:12

## 2020-02-16 RX ADMIN — Medication 200 MILLIGRAM(S): at 22:20

## 2020-02-16 RX ADMIN — SIMETHICONE 80 MILLIGRAM(S): 80 TABLET, CHEWABLE ORAL at 12:27

## 2020-02-16 RX ADMIN — Medication 975 MILLIGRAM(S): at 16:00

## 2020-02-16 RX ADMIN — Medication 975 MILLIGRAM(S): at 00:20

## 2020-02-16 RX ADMIN — SIMETHICONE 80 MILLIGRAM(S): 80 TABLET, CHEWABLE ORAL at 00:01

## 2020-02-16 RX ADMIN — Medication 200 MILLIGRAM(S): at 07:44

## 2020-02-16 RX ADMIN — Medication 975 MILLIGRAM(S): at 21:00

## 2020-02-16 RX ADMIN — MAGNESIUM HYDROXIDE 30 MILLILITER(S): 400 TABLET, CHEWABLE ORAL at 15:00

## 2020-02-16 RX ADMIN — HEPARIN SODIUM 5000 UNIT(S): 5000 INJECTION INTRAVENOUS; SUBCUTANEOUS at 12:27

## 2020-02-16 RX ADMIN — Medication 1 TABLET(S): at 00:13

## 2020-02-16 RX ADMIN — Medication 975 MILLIGRAM(S): at 20:23

## 2020-02-16 NOTE — PROGRESS NOTE ADULT - PROBLEM SELECTOR PLAN 1
Patient with baseline creatinine of 1.5-1.8 now presented to the hospital with creatinine ~2.0, up to 2.5, now trending down to 2.2 (20), post  delivery. This acute rise is likely related to post surgical blood loss, lack of PO intake and increased load on kidney from delivery resulting in a pre-renal state. For now will encorage PO intake, encourage fluid intake. Continue to control BP and avoid hypotension. Okay to continue fluids as well and monitor on current regimen however would change to NS due to potassium being elevated and would restrict potassium for now. Patient with baseline creatinine of 1.5-1.8 now presented to the hospital with creatinine ~2.0, up to 2.5, now trending down to 2.2 (20), post  delivery. This acute rise is likely related to post surgical blood loss, lack of PO intake and increased load on kidney from delivery resulting in a pre-renal state. For now will encourage PO intake, encourage fluid intake. Continue to control BP and avoid hypotension. Okay to continue fluids as well and monitor on current regimen however would change to NS due to potassium being elevated and would restrict potassium for now.

## 2020-02-16 NOTE — PROVIDER CONTACT NOTE (OTHER) - ASSESSMENT
vital signs stable , 02 sat 100 percent ,pt calm ,alert, pt has stuffy nose and is using saline spray ,occasional scant blood from nose , lungs clear, using  incentive spirometer with encouragement ,,Mylicon and  Tylenol given

## 2020-02-16 NOTE — PROGRESS NOTE ADULT - ASSESSMENT
A/P: 33yo with siPEC with severe features now POD#3 s/p rLTCS with postpartum complicated by uptrending creatinine and proteinuria. Patient is stable and doing well post-operatively.

## 2020-02-16 NOTE — PROGRESS NOTE ADULT - PROBLEM SELECTOR PLAN 2
Patient with proteinuria, ~5g on this admission. Would send serological workup for nephrotic range proteinuria including PLA2R level, C3/C4, Hep B/C Panel, Cryoglobulins, C3/C4, ASHLEY, Anti-Sm, Anti-dsDNA. This can also be pre-eclampsia as well given sudden rise in proteinuria for this patient that has never been in the nephrotic range. Continue to control BP, monitor for hemolysis. Check Lipid Panel as well. Likely would be focal sclerosis however may require biopsy in the future and needs close monitoring. Should follow-up with Dr. Tesha Benavides at [address] [place] [address] after discharge.

## 2020-02-16 NOTE — PROGRESS NOTE ADULT - SUBJECTIVE AND OBJECTIVE BOX
[facility] Division of Kidney Diseases & Hypertension  FOLLOW UP NOTE  [phone]--------------------------------------------------------------------------------  Chief Complaint:Presence of other specified functional implants    HPI: 34 year old female with HTN, proteinuria, previous history of  section who presented to the hospital for scheduled  now with worsening proteinuria and KAYLA for which nephrology is consulted. Patient initially followed with Dr. Sims as outpatient and has been noted to have mildly elevated renal function with creatinine of 1.5-1.8 in the last 2 years with proteinuria however has been around 1-2 grams. Patient's  states that she had close to no proteinuria up to ~1g between first and second pregnancy. Prior to the first pregnancy patient had not seen a physician and did not know of any kidney issues. During second pregnancy patient also had proteinuria up to ~2g and was hypertensive as well however was chronic hypertension. Patient had uneventful second pregnancy and underwent  on . Nephrology now consulted for management of KAYLA and proteinuria.    Patient stable today with downtrending creatinine. Patient feels well without any major complaints. Labs, vitals, and medications reviewed. Vital signs stable.      PAST HISTORY  --------------------------------------------------------------------------------  No significant changes to PMH, PSH, FHx, SHx, unless otherwise noted    ALLERGIES & MEDICATIONS  --------------------------------------------------------------------------------  Allergies    No Known Allergies    Intolerances      Standing Inpatient Medications  acetaminophen   Tablet .. 975 milliGRAM(s) Oral <User Schedule>  diphtheria/tetanus/pertussis (acellular) Vaccine (ADAcel) 0.5 milliLiter(s) IntraMuscular once  heparin  Injectable 5000 Unit(s) SubCutaneous every 12 hours  labetalol 200 milliGRAM(s) Oral three times a day  NIFEdipine XL 90 milliGRAM(s) Oral daily    PRN Inpatient Medications  diphenhydrAMINE 25 milliGRAM(s) Oral every 6 hours PRN  glycerin Suppository - Adult 1 Suppository(s) Rectal at bedtime PRN  lanolin Ointment 1 Application(s) Topical every 6 hours PRN  magnesium hydroxide Suspension 30 milliLiter(s) Oral two times a day PRN  oxyCODONE    IR 5 milliGRAM(s) Oral every 3 hours PRN  oxyCODONE    IR 5 milliGRAM(s) Oral once PRN  simethicone 80 milliGRAM(s) Chew every 4 hours PRN  sodium chloride 0.65% Nasal 1 Spray(s) Both Nostrils every 6 hours PRN      REVIEW OF SYSTEMS  --------------------------------------------------------------------------------  Gen: No  fevers/chills  Skin: No rashes  Head/Eyes/Ears/Mouth: No headache; Normal hearing; Normal vision w/o blurriness  Respiratory: No dyspnea, cough, wheezing, hemoptysis  CV: No chest pain, PND, orthopnea  GI: No abdominal pain, diarrhea, constipation, nausea, vomiting  : No increased frequency, dysuria, hematuria, nocturia  MSK: No joint pain/swelling; no back pain; no edema  Neuro: No dizziness/lightheadedness, weakness, seizures, numbness, tingling  Psych: Non-focal      All other systems were reviewed and are negative, except as noted.    VITALS/PHYSICAL EXAM  --------------------------------------------------------------------------------  T(C): 36.8 (20 @ 11:31), Max: 37.1 (02-15-20 @ 14:12)  HR: 80 (20 @ 11:31) (72 - 89)  BP: 118/73 (20 @ 11:31) (117/64 - 141/84)  RR: 17 (20 @ 11:31) (16 - 17)  SpO2: 98% (20 @ 11:31) (96% - 100%)  Wt(kg): --        02-15-20 @ 07:01  -  20 @ 07:00  --------------------------------------------------------  IN: 6295 mL / OUT: 3450 mL / NET: 2845 mL    20 @ 07:01  -  20 @ 14:02  --------------------------------------------------------  IN: 0 mL / OUT: 800 mL / NET: -800 mL      Physical Exam:  	Gen: NAD, well-appearing  	HEENT: PERRL, supple neck, clear oropharynx              Lymph: No palpable lymph nodes present.  	Pulm: CTA B/L  	CV: RRR, S1S2;  	Back: No spinal or CVA tenderness  	Abd: +BS, soft, nontender/nondistended  	: No suprapubic tenderness              Extremities: Some swelling of the lower extremities.              Neuro: No focal deficits  	Skin: Warm, without rashes  	Vascular: No cyanosis    LABS/STUDIES  --------------------------------------------------------------------------------    136  |  107  |  41  ----------------------------<  82      [20 07:54]  5.3   |  17  |  2.28        Ca     8.3     [20 07:54]      Mg     3.4     [02-15-20 @ 06:00]      Phos  4.8     [02-15-20 @ 06:00]            Creatinine Trend:  SCr 2.28 [:54]  SCr 2.56 [02-15 @ 06:00]  SCr 2.55 [ 18:15]  SCr 2.27 [ 05:48]  SCr 2.17 [ 21:55]    Urinalysis - [20 @ 12:00]      Color LIGHT YELLOW / Appearance CLEAR / SG 1.010 / pH 6.5      Gluc NEGATIVE / Ketone NEGATIVE  / Bili NEGATIVE / Urobili NORMAL       Blood SMALL / Protein 200 / Leuk Est NEGATIVE / Nitrite NEGATIVE      RBC 3-5 / WBC 0-2 / Hyaline NEGATIVE / Gran  / Sq Epi OCC / Non Sq Epi  / Bacteria NEGATIVE    Urine Creatinine 42.70      [20 @ 12:00]  Urine Protein 198.9      [20 12:00]  Urine Sodium 28      [20 20:30]  Urine Potassium 10.4      [20 20:30]  Urine Chloride 24      [20 20:30]  Urine Osmolality 154      [20 20:30]    Lipid: chol 250, , HDL 53, LDL --      [20 07:54]    HBsAg NEGATIVE      [20 07:01]    C3 Complement 144.0      [20 07:01]  C4 Complement 24.4      [20 07:01]

## 2020-02-16 NOTE — PROVIDER CONTACT NOTE (OTHER) - ACTION/TREATMENT ORDERED:
as per OB Esra Demerol, will pause Mag, draw mag serum, and await results. OB intern will round on patient.
Tums given@ 0010 ,0030 pt remains calm ,pt states gas/ epigastric pain less and no SOB at this time, pt was resting quietly with eyes closed  when RN entered room ,emotional support given

## 2020-02-16 NOTE — PROGRESS NOTE ADULT - PROBLEM SELECTOR PLAN 1
- Patient with baseline elevated creatinine of 1.4, followed by nephrology prior to pregnancy  - Nephrology currently following. Uptrending creatinine possibly due to PEC vs intrinsice renal injury. Will follow up labs and further recs.  - Renal sonogram (2/15) showing trace R hydronephrosis but overall reassuring  - Patient voiding spontaneously without difficulty  - S/p Magnesium (2/13)  - BPs well controlled. Continue labetalol and procardia.  - Continue regular diet.  - Increase ambulation.  - Continue motrin, tylenol, oxycodone PRN for pain control.    - Patient asymptomatic with no evidence of ongoing bleeding.  - Discharge planning    Shey Maria, PGY-1

## 2020-02-16 NOTE — PROGRESS NOTE ADULT - SUBJECTIVE AND OBJECTIVE BOX
OB Progress Note:  Delivery, POD#3    S: 35yo with siPEC with severe features now POD#3 s/p rLTCS with postpartum complicated by uptrending creatinine and proteinuria. Her pain is well controlled. She is tolerating a regular diet and passing flatus. Denies N/V. Denies CP/SOB/lightheadedness/dizziness. Endorses light vaginal bleeding, less than one pad per hour. She is ambulating without difficulty. Voiding spontaneously.     O:   Vital Signs Last 24 Hrs  T(C): 36.6 (2020 05:22), Max: 37.1 (15 Feb 2020 14:12)  T(F): 97.9 (2020 05:22), Max: 98.7 (15 Feb 2020 14:12)  HR: 86 (2020 07:41) (72 - 89)  BP: 131/71 (2020 07:41) (117/64 - 141/84)  BP(mean): --  RR: 16 (2020 07:41) (16 - 19)  SpO2: 100% (2020 07:41) (96% - 100%)    PE:  General: NAD  Heart: extremities well-perfused  Lungs: breathing comfortably  Abdomen: Mildly distended, appropriately tender, fundus firm, incision c/d/i   Extremities: No erythema, no pitting edema    Labs:  Blood type: B Positive  Rubella IgG: RPR: Negative                          9.5<L>   13.72<H> >-----------< 226    (  @ 05:48 )             29.2<L>                        9.8<L>   14.63<H> >-----------< 232    (  @ 21:55 )             29.7<L>                        10.3<L>   10.62<H> >-----------< 249    (  @ 09:39 )             32.9<L>    02-15-20 @ 06:00      135  |  106  |  41<H>  ----------------------------<  115<H>  4.5   |  17<L>  |  2.56<H>    20 @ 18:15      133<L>  |  106  |  39<H>  ----------------------------<  124<H>  4.9   |  16<L>  |  2.55<H>    20 @ 05:48      133<L>  |  105  |  32<H>  ----------------------------<  97  4.9   |  16<L>  |  2.27<H>    20 @ 21:55      135  |  109<H>  |  33<H>  ----------------------------<  85  4.5   |  16<L>  |  2.17<H>    20 @ 09:39      135  |  108<H>  |  36<H>  ----------------------------<  89  4.7   |  15<L>  |  2.20<H>        Ca    8.5      15 2020 06:00  Ca    8.5      2020 18:15  Ca    8.9      2020 05:48  Ca    9.1      2020 21:55  Ca    9.5      2020 09:39  Phos  4.8<H>     02-15  Mg     3.4<H>     02-15  Mg     6.4<H>         TPro  5.8<L>  /  Alb  2.5<L>  /  TBili  0.2  /  DBili  x   /  AST  19  /  ALT  9   /  AlkPhos  92  20 @ 05:48  TPro  5.5<L>  /  Alb  2.6<L>  /  TBili  < 0.2<L>  /  DBili  x   /  AST  19  /  ALT  15  /  AlkPhos  93  20 @ 21:55  TPro  6.6  /  Alb  3.1<L>  /  TBili  < 0.2<L>  /  DBili  x   /  AST  12  /  ALT  13  /  AlkPhos  108  20 @ 09:39

## 2020-02-16 NOTE — PROGRESS NOTE ADULT - SUBJECTIVE AND OBJECTIVE BOX
pt seen and evaluated by me, POD # 3 , s/p RLST C/S severe superimposed pre-eclampsia with worsening renal function.  doing well,  continue routine post op care

## 2020-02-17 ENCOUNTER — TRANSCRIPTION ENCOUNTER (OUTPATIENT)
Age: 35
End: 2020-02-17

## 2020-02-17 VITALS
TEMPERATURE: 98 F | RESPIRATION RATE: 16 BRPM | DIASTOLIC BLOOD PRESSURE: 78 MMHG | SYSTOLIC BLOOD PRESSURE: 131 MMHG | OXYGEN SATURATION: 97 % | HEART RATE: 89 BPM

## 2020-02-17 LAB
ANION GAP SERPL CALC-SCNC: 11 MMO/L — SIGNIFICANT CHANGE UP (ref 7–14)
BUN SERPL-MCNC: 45 MG/DL — HIGH (ref 7–23)
CALCIUM SERPL-MCNC: 8.1 MG/DL — LOW (ref 8.4–10.5)
CHLORIDE SERPL-SCNC: 111 MMOL/L — HIGH (ref 98–107)
CO2 SERPL-SCNC: 16 MMOL/L — LOW (ref 22–31)
CREAT SERPL-MCNC: 2.26 MG/DL — HIGH (ref 0.5–1.3)
DSDNA AB SER-ACNC: <12 IU/ML — SIGNIFICANT CHANGE UP
GLUCOSE SERPL-MCNC: 90 MG/DL — SIGNIFICANT CHANGE UP (ref 70–99)
HCV RNA SERPL NAA DL=5-ACNC: NOT DETECTED — SIGNIFICANT CHANGE UP
HCV RNA SPEC NAA+PROBE-LOG IU: SIGNIFICANT CHANGE UP
POTASSIUM SERPL-MCNC: 5.3 MMOL/L — SIGNIFICANT CHANGE UP (ref 3.5–5.3)
POTASSIUM SERPL-SCNC: 5.3 MMOL/L — SIGNIFICANT CHANGE UP (ref 3.5–5.3)
SODIUM SERPL-SCNC: 138 MMOL/L — SIGNIFICANT CHANGE UP (ref 135–145)

## 2020-02-17 PROCEDURE — 99232 SBSQ HOSP IP/OBS MODERATE 35: CPT | Mod: GC

## 2020-02-17 RX ADMIN — Medication 975 MILLIGRAM(S): at 02:34

## 2020-02-17 RX ADMIN — Medication 975 MILLIGRAM(S): at 03:30

## 2020-02-17 RX ADMIN — Medication 200 MILLIGRAM(S): at 06:02

## 2020-02-17 RX ADMIN — SIMETHICONE 80 MILLIGRAM(S): 80 TABLET, CHEWABLE ORAL at 01:36

## 2020-02-17 NOTE — PROGRESS NOTE ADULT - SUBJECTIVE AND OBJECTIVE BOX
[facility] DIVISION OF KIDNEY DISEASES AND HYPERTENSION -- FOLLOW UP NOTE  --------------------------------------------------------------------------------  Chief Complaint: lower ext swelling  HPI:34F PMHx HTN, proteinuria, hx  section admitted for scheduled  (2020) complicated by worsening proteinuria and KAYLA. Baseline serum Cr 1.5-1.8, prior proteinuria 1-2g (see in prior 2 pregnancy) per patient/family.  Nephrology consulted for management of KAYLA and proteinuria. Proteinuria up to 5g however creatinine now resolving likely pre-renal/ATN and getting fluids.    24 hour events/subjective:  - overnight no events reported, vitals afebrile non-hypotensive, total UOP 4.6L/24hr voided (net +1.9L)  - patient seen and examined at bedside this morning without complain report feeling better today (pain well controlled), tolerating PO and passing flatus.  - vitals/lab/medications reviewed, noted for sCr 2.2 unchanged compare yesterday, serum K 5.3 (not on low K diet)    PAST HISTORY  --------------------------------------------------------------------------------  No significant changes to PMH, PSH, FHx, SHx, unless otherwise noted    ALLERGIES & MEDICATIONS  --------------------------------------------------------------------------------  Allergies    No Known Allergies    Intolerances      Standing Inpatient Medications  acetaminophen   Tablet .. 975 milliGRAM(s) Oral <User Schedule>  diphtheria/tetanus/pertussis (acellular) Vaccine (ADAcel) 0.5 milliLiter(s) IntraMuscular once  heparin  Injectable 5000 Unit(s) SubCutaneous every 12 hours  labetalol 200 milliGRAM(s) Oral three times a day  NIFEdipine XL 90 milliGRAM(s) Oral daily    PRN Inpatient Medications  diphenhydrAMINE 25 milliGRAM(s) Oral every 6 hours PRN  glycerin Suppository - Adult 1 Suppository(s) Rectal at bedtime PRN  lanolin Ointment 1 Application(s) Topical every 6 hours PRN  magnesium hydroxide Suspension 30 milliLiter(s) Oral two times a day PRN  oxyCODONE    IR 5 milliGRAM(s) Oral every 3 hours PRN  oxyCODONE    IR 5 milliGRAM(s) Oral once PRN  senna 2 Tablet(s) Oral at bedtime PRN  simethicone 80 milliGRAM(s) Chew every 4 hours PRN  sodium chloride 0.65% Nasal 1 Spray(s) Both Nostrils every 6 hours PRN      REVIEW OF SYSTEMS  --------------------------------------------------------------------------------  Gen: No fatigue, fevers/chills, weakness  Respiratory: No dyspnea, cough  CV: No chest pain  GI: No abdominal pain, diarrhea, constipation, nausea, vomiting, melena  : No increased frequency, dysuria, hematuria  MSK: + edema  Neuro: No dizziness/lightheadedness, weakness    All other systems were reviewed and are negative, except as noted.    VITALS/PHYSICAL EXAM  --------------------------------------------------------------------------------  T(C): 36.8 (20 @ 05:16), Max: 37.1 (20 @ 18:37)  HR: 89 (20 @ 05:16) (81 - 93)  BP: 131/78 (20 @ 05:16) (121/71 - 146/89)  RR: 16 (20 @ 05:16) (16 - 18)  SpO2: 97% (20 @ 05:16) (97% - 99%)  Wt(kg): --        20 @ 07:01  -  20 @ 07:00  --------------------------------------------------------  IN: 6610 mL / OUT: 4660 mL / NET: 1950 mL      Physical Exam:  	Gen: NAD, well-appearing on room air  	HEENT: moist mucous membrane, supple neck  	Pulm: CTA B/L  	CV: tachycardic, Regular rhythm, S1S2; no rub  	Abd: +BS, soft, nontender/nondistended  	LE: Warm, bilateral lower extremity edema +2  	Psych: Normal affect and mood  	Skin: Warm, no cyanosis  	Vascular access: n/a    LABS/STUDIES  --------------------------------------------------------------------------------    138  |  111  |  45  ----------------------------<  90      [20 @ 06:00]  5.3   |  16  |  2.26        Ca     8.1     [20 @ 06:00]      Creatinine Trend:  SCr 2.26 [ 06:00]  SCr 2.28 [ @ 07:54]  SCr 2.56 [02-15 @ 06:00]  SCr 2.55 [ 18:15]  SCr 2.27 [ @ 05:48]    Urinalysis - [20 @ 12:00]      Color LIGHT YELLOW / Appearance CLEAR / SG 1.010 / pH 6.5      Gluc NEGATIVE / Ketone NEGATIVE  / Bili NEGATIVE / Urobili NORMAL       Blood SMALL / Protein 200 / Leuk Est NEGATIVE / Nitrite NEGATIVE      RBC 3-5 / WBC 0-2 / Hyaline NEGATIVE / Gran  / Sq Epi OCC / Non Sq Epi  / Bacteria NEGATIVE    Urine Creatinine 42.70      [20 @ 12:00]  Urine Protein 198.9      [20 @ 12:00]  Urine Sodium 28      [20 @ 20:30]  Urine Potassium 10.4      [20 @ 20:30]  Urine Chloride 24      [20 @ 20:30]  Urine Osmolality 154      [20 @ 20:30]    Lipid: chol 250, , HDL 53, LDL --      [20 @ 07:54]    HBsAg NEGATIVE      [20 @ 07:01]    dsDNA <12      [20 @ 07:01]  C3 Complement 144.0      [20 @ 07:01]  C4 Complement 24.4      [20 @ 07:01]  Syphilis Screen (Treponema Pallidum Ab) Negative      [20 @ 10:00]

## 2020-02-17 NOTE — PROGRESS NOTE ADULT - PROBLEM SELECTOR PLAN 1
KAYLA on CKD (unclear etiology) likely pre renal in setting post surgical blood loss, decrease PO intake.  On admission sCr 2.1 (baseline sCr 1.5-1.8 per pt/), peaked 2.5 (2/15), improving after IVF today sCr 2.2.  Unclear etiology CKD.  Recommendations:  - please discontinue IVF (normal saline) given patient is tolerating PO and eating/drinking.  - avoid nephrotoxic agents (ACEi/ARB, NSAIDS), renally dose medications per GFR  - upon discharge patient advise to call nephrology clinic [address] [place] [address] to schedule appointment with Dr. Benavides (given telephone) for outpatient follow up nephrologist for further management including workup CKD? preeclampsia

## 2020-02-17 NOTE — PROGRESS NOTE ADULT - SUBJECTIVE AND OBJECTIVE BOX
OB Postpartum Note:  Delivery, POD#4    S: 35yo POD#4 s/p LTCS. The patient feels well.  Pain is well controlled. She is tolerating a regular diet and passing flatus. She is voiding spontaneously, and ambulating without difficulty. Denies CP/SOB. Denies lightheadedness/dizziness. Denies N/V.    O:  Vitals:  Vital Signs Last 24 Hrs  T(C): 36.8 (2020 05:16), Max: 37.1 (2020 18:37)  T(F): 98.2 (2020 05:16), Max: 98.7 (2020 18:37)  HR: 89 (2020 05:16) (80 - 93)  BP: 131/78 (2020 05:16) (118/73 - 146/89)  BP(mean): --  RR: 16 (2020 05:16) (16 - 18)  SpO2: 97% (2020 05:16) (97% - 100%)    MEDICATIONS  (STANDING):  acetaminophen   Tablet .. 975 milliGRAM(s) Oral <User Schedule>  diphtheria/tetanus/pertussis (acellular) Vaccine (ADAcel) 0.5 milliLiter(s) IntraMuscular once  heparin  Injectable 5000 Unit(s) SubCutaneous every 12 hours  labetalol 200 milliGRAM(s) Oral three times a day  NIFEdipine XL 90 milliGRAM(s) Oral daily  sodium chloride 0.9%. 1000 milliLiter(s) (125 mL/Hr) IV Continuous <Continuous>    MEDICATIONS  (PRN):  diphenhydrAMINE 25 milliGRAM(s) Oral every 6 hours PRN Itching  glycerin Suppository - Adult 1 Suppository(s) Rectal at bedtime PRN Constipation  lanolin Ointment 1 Application(s) Topical every 6 hours PRN Sore Nipples  magnesium hydroxide Suspension 30 milliLiter(s) Oral two times a day PRN Constipation  oxyCODONE    IR 5 milliGRAM(s) Oral every 3 hours PRN Moderate to Severe Pain (4-10)  oxyCODONE    IR 5 milliGRAM(s) Oral once PRN Moderate to Severe Pain (4-10)  senna 2 Tablet(s) Oral at bedtime PRN Constipation  simethicone 80 milliGRAM(s) Chew every 4 hours PRN Gas  sodium chloride 0.65% Nasal 1 Spray(s) Both Nostrils every 6 hours PRN Nasal Congestion      LABS:  Blood type: B Positive  Rubella IgG: RPR: Negative      20 @ 07:54      136  |  107  |  41<H>  ----------------------------<  82  5.3   |  17<L>  |  2.28<H>    02-15-20 @ 06:00      135  |  106  |  41<H>  ----------------------------<  115<H>  4.5   |  17<L>  |  2.56<H>    20 @ 18:15      133<L>  |  106  |  39<H>  ----------------------------<  124<H>  4.9   |  16<L>  |  2.55<H>        Ca    8.3<L>      2020 07:54  Ca    8.5      15 2020 06:00  Ca    8.5      2020 18:15  Phos  4.8<H>     02-15  Mg     3.4<H>     02-15            Physical exam:  Gen: NAD  Abdomen: Soft, nontender, no distension , firm uterine fundus at umbilicus.  Incision: Clean, dry, and intact   Pelvic: Normal lochia noted  Ext: No calf tenderness

## 2020-02-17 NOTE — PROGRESS NOTE ADULT - PROBLEM SELECTOR PLAN 2
Patient with proteinuria, ~5g on this admission (prior range 1-2g proteinuria in prior pregnancy per pt/) unclear etiology possibly CKD preeclampsia however no hx renal biopsy.   - workup labs can be followed up with outpatient nephrologist, serological workup for nephrotic range proteinuria including PLA2R level, C3/C4, Hep B/C Panel, Cryoglobulins, C3/C4, ASHLEY, Anti-Sm, Anti-dsDNA.  Will order for VEGF before discharge.  Continue to control BP  - Should follow-up with Dr. Tesha Benavides at [address] [place] [address] after discharge.

## 2020-02-17 NOTE — PROGRESS NOTE ADULT - ATTENDING COMMENTS
Patient seen and examined. Doing well, ambulating, pain well controlled, passing flatus. Cr 2.27, urine output adequate. Continue keppra for seizure ppx.
Pt seen and examined by me today. I agree with findings, assessment and plan documented in resident note.
Recommendations as noted above.  Would stop intravenous fluids.  Electrolytes consistent with hyperchloremic non anion gap metabolic acidosis likely at least in part due to use of NS.  Reviewed course with patient and  at the bedside.

## 2020-02-17 NOTE — PROGRESS NOTE ADULT - ASSESSMENT
A/P: 35yo POD#4 s/p LTCS c/b siPEC w/ severe features, with uptrending creatinine being followed by nephrology. AM labs pending.  Patient is stable and is doing well post-operatively.

## 2020-02-18 ENCOUNTER — INPATIENT (INPATIENT)
Facility: HOSPITAL | Age: 35
LOS: 2 days | Discharge: HOME | End: 2020-02-21
Attending: OBSTETRICS & GYNECOLOGY | Admitting: OBSTETRICS & GYNECOLOGY
Payer: COMMERCIAL

## 2020-02-18 VITALS
SYSTOLIC BLOOD PRESSURE: 133 MMHG | HEART RATE: 82 BPM | OXYGEN SATURATION: 97 % | TEMPERATURE: 98 F | DIASTOLIC BLOOD PRESSURE: 78 MMHG | RESPIRATION RATE: 11 BRPM

## 2020-02-18 DIAGNOSIS — O14.90 UNSPECIFIED PRE-ECLAMPSIA, UNSPECIFIED TRIMESTER: ICD-10-CM

## 2020-02-18 DIAGNOSIS — Z90.49 ACQUIRED ABSENCE OF OTHER SPECIFIED PARTS OF DIGESTIVE TRACT: Chronic | ICD-10-CM

## 2020-02-18 DIAGNOSIS — Z98.891 HISTORY OF UTERINE SCAR FROM PREVIOUS SURGERY: Chronic | ICD-10-CM

## 2020-02-18 LAB
ALBUMIN SERPL ELPH-MCNC: 2.4 G/DL — LOW (ref 3.3–5)
ALBUMIN SERPL ELPH-MCNC: 2.6 G/DL — LOW (ref 3.3–5)
ALBUMIN SERPL ELPH-MCNC: 2.7 G/DL — LOW (ref 3.3–5)
ALP SERPL-CCNC: 85 U/L — SIGNIFICANT CHANGE UP (ref 40–120)
ALP SERPL-CCNC: 85 U/L — SIGNIFICANT CHANGE UP (ref 40–120)
ALP SERPL-CCNC: 89 U/L — SIGNIFICANT CHANGE UP (ref 40–120)
ALT FLD-CCNC: 28 U/L — SIGNIFICANT CHANGE UP (ref 4–33)
ALT FLD-CCNC: 29 U/L — SIGNIFICANT CHANGE UP (ref 4–33)
ALT FLD-CCNC: 34 U/L — HIGH (ref 4–33)
ANA TITR SER: NEGATIVE — SIGNIFICANT CHANGE UP
ANION GAP SERPL CALC-SCNC: 11 MMO/L — SIGNIFICANT CHANGE UP (ref 7–14)
ANION GAP SERPL CALC-SCNC: 13 MMO/L — SIGNIFICANT CHANGE UP (ref 7–14)
ANION GAP SERPL CALC-SCNC: 9 MMO/L — SIGNIFICANT CHANGE UP (ref 7–14)
APPEARANCE UR: CLEAR — SIGNIFICANT CHANGE UP
APTT BLD: 25.6 SEC — LOW (ref 27.5–36.3)
APTT BLD: 27.4 SEC — LOW (ref 27.5–36.3)
APTT BLD: 28.5 SEC — SIGNIFICANT CHANGE UP (ref 27.5–36.3)
AST SERPL-CCNC: 27 U/L — SIGNIFICANT CHANGE UP (ref 4–32)
AST SERPL-CCNC: 29 U/L — SIGNIFICANT CHANGE UP (ref 4–32)
AST SERPL-CCNC: 38 U/L — HIGH (ref 4–32)
BACTERIA # UR AUTO: NEGATIVE — SIGNIFICANT CHANGE UP
BASOPHILS # BLD AUTO: 0.01 K/UL — SIGNIFICANT CHANGE UP (ref 0–0.2)
BASOPHILS # BLD AUTO: 0.02 K/UL — SIGNIFICANT CHANGE UP (ref 0–0.2)
BASOPHILS # BLD AUTO: 0.03 K/UL — SIGNIFICANT CHANGE UP (ref 0–0.2)
BASOPHILS NFR BLD AUTO: 0.1 % — SIGNIFICANT CHANGE UP (ref 0–2)
BASOPHILS NFR BLD AUTO: 0.2 % — SIGNIFICANT CHANGE UP (ref 0–2)
BASOPHILS NFR BLD AUTO: 0.3 % — SIGNIFICANT CHANGE UP (ref 0–2)
BILIRUB SERPL-MCNC: 0.2 MG/DL — SIGNIFICANT CHANGE UP (ref 0.2–1.2)
BILIRUB SERPL-MCNC: < 0.2 MG/DL — LOW (ref 0.2–1.2)
BILIRUB SERPL-MCNC: < 0.2 MG/DL — LOW (ref 0.2–1.2)
BILIRUB UR-MCNC: NEGATIVE — SIGNIFICANT CHANGE UP
BLOOD UR QL VISUAL: SIGNIFICANT CHANGE UP
BUN SERPL-MCNC: 44 MG/DL — HIGH (ref 7–23)
BUN SERPL-MCNC: 44 MG/DL — HIGH (ref 7–23)
BUN SERPL-MCNC: 46 MG/DL — HIGH (ref 7–23)
CALCIUM SERPL-MCNC: 8.5 MG/DL — SIGNIFICANT CHANGE UP (ref 8.4–10.5)
CALCIUM SERPL-MCNC: 8.6 MG/DL — SIGNIFICANT CHANGE UP (ref 8.4–10.5)
CALCIUM SERPL-MCNC: 8.7 MG/DL — SIGNIFICANT CHANGE UP (ref 8.4–10.5)
CHLORIDE SERPL-SCNC: 106 MMOL/L — SIGNIFICANT CHANGE UP (ref 98–107)
CHLORIDE SERPL-SCNC: 107 MMOL/L — SIGNIFICANT CHANGE UP (ref 98–107)
CHLORIDE SERPL-SCNC: 109 MMOL/L — HIGH (ref 98–107)
CK MB BLD-MCNC: 1.94 NG/ML — SIGNIFICANT CHANGE UP (ref 1–4.7)
CK SERPL-CCNC: 84 U/L — SIGNIFICANT CHANGE UP (ref 25–170)
CK SERPL-CCNC: 94 U/L — SIGNIFICANT CHANGE UP (ref 25–170)
CO2 SERPL-SCNC: 15 MMOL/L — LOW (ref 22–31)
CO2 SERPL-SCNC: 16 MMOL/L — LOW (ref 22–31)
CO2 SERPL-SCNC: 16 MMOL/L — LOW (ref 22–31)
COLOR SPEC: COLORLESS — SIGNIFICANT CHANGE UP
CREAT SERPL-MCNC: 2.39 MG/DL — HIGH (ref 0.5–1.3)
CREAT SERPL-MCNC: 2.43 MG/DL — HIGH (ref 0.5–1.3)
CREAT SERPL-MCNC: 2.54 MG/DL — HIGH (ref 0.5–1.3)
EOSINOPHIL # BLD AUTO: 0.53 K/UL — HIGH (ref 0–0.5)
EOSINOPHIL # BLD AUTO: 0.53 K/UL — HIGH (ref 0–0.5)
EOSINOPHIL # BLD AUTO: 0.54 K/UL — HIGH (ref 0–0.5)
EOSINOPHIL NFR BLD AUTO: 5.1 % — SIGNIFICANT CHANGE UP (ref 0–6)
FIBRINOGEN PPP-MCNC: 729 MG/DL — HIGH (ref 350–510)
FIBRINOGEN PPP-MCNC: 814 MG/DL — HIGH (ref 350–510)
FIBRINOGEN PPP-MCNC: > 747 MG/DL — HIGH (ref 350–510)
GLUCOSE SERPL-MCNC: 90 MG/DL — SIGNIFICANT CHANGE UP (ref 70–99)
GLUCOSE SERPL-MCNC: 97 MG/DL — SIGNIFICANT CHANGE UP (ref 70–99)
GLUCOSE SERPL-MCNC: 98 MG/DL — SIGNIFICANT CHANGE UP (ref 70–99)
GLUCOSE UR-MCNC: NEGATIVE — SIGNIFICANT CHANGE UP
HCT VFR BLD CALC: 23.2 % — LOW (ref 34.5–45)
HCT VFR BLD CALC: 23.4 % — LOW (ref 34.5–45)
HCT VFR BLD CALC: 23.7 % — LOW (ref 34.5–45)
HGB BLD-MCNC: 7.2 G/DL — LOW (ref 11.5–15.5)
HGB BLD-MCNC: 7.4 G/DL — LOW (ref 11.5–15.5)
HGB BLD-MCNC: 7.5 G/DL — LOW (ref 11.5–15.5)
HYALINE CASTS # UR AUTO: NEGATIVE — SIGNIFICANT CHANGE UP
IMM GRANULOCYTES NFR BLD AUTO: 2.5 % — HIGH (ref 0–1.5)
IMM GRANULOCYTES NFR BLD AUTO: 2.7 % — HIGH (ref 0–1.5)
IMM GRANULOCYTES NFR BLD AUTO: 3 % — HIGH (ref 0–1.5)
INR BLD: 0.88 — SIGNIFICANT CHANGE UP (ref 0.88–1.17)
INR BLD: 0.88 — SIGNIFICANT CHANGE UP (ref 0.88–1.17)
KETONES UR-MCNC: NEGATIVE — SIGNIFICANT CHANGE UP
LDH SERPL L TO P-CCNC: 223 U/L — SIGNIFICANT CHANGE UP (ref 135–225)
LDH SERPL L TO P-CCNC: 228 U/L — HIGH (ref 135–225)
LDH SERPL L TO P-CCNC: 255 U/L — HIGH (ref 135–225)
LEUKOCYTE ESTERASE UR-ACNC: NEGATIVE — SIGNIFICANT CHANGE UP
LYMPHOCYTES # BLD AUTO: 0.81 K/UL — LOW (ref 1–3.3)
LYMPHOCYTES # BLD AUTO: 0.95 K/UL — LOW (ref 1–3.3)
LYMPHOCYTES # BLD AUTO: 0.99 K/UL — LOW (ref 1–3.3)
LYMPHOCYTES # BLD AUTO: 7.8 % — LOW (ref 13–44)
LYMPHOCYTES # BLD AUTO: 9.1 % — LOW (ref 13–44)
LYMPHOCYTES # BLD AUTO: 9.3 % — LOW (ref 13–44)
MCHC RBC-ENTMCNC: 29.4 PG — SIGNIFICANT CHANGE UP (ref 27–34)
MCHC RBC-ENTMCNC: 29.5 PG — SIGNIFICANT CHANGE UP (ref 27–34)
MCHC RBC-ENTMCNC: 29.5 PG — SIGNIFICANT CHANGE UP (ref 27–34)
MCHC RBC-ENTMCNC: 30.8 % — LOW (ref 32–36)
MCHC RBC-ENTMCNC: 31.6 % — LOW (ref 32–36)
MCHC RBC-ENTMCNC: 31.9 % — LOW (ref 32–36)
MCV RBC AUTO: 92.4 FL — SIGNIFICANT CHANGE UP (ref 80–100)
MCV RBC AUTO: 92.9 FL — SIGNIFICANT CHANGE UP (ref 80–100)
MCV RBC AUTO: 95.9 FL — SIGNIFICANT CHANGE UP (ref 80–100)
MONOCYTES # BLD AUTO: 0.66 K/UL — SIGNIFICANT CHANGE UP (ref 0–0.9)
MONOCYTES # BLD AUTO: 0.72 K/UL — SIGNIFICANT CHANGE UP (ref 0–0.9)
MONOCYTES # BLD AUTO: 0.76 K/UL — SIGNIFICANT CHANGE UP (ref 0–0.9)
MONOCYTES NFR BLD AUTO: 6.2 % — SIGNIFICANT CHANGE UP (ref 2–14)
MONOCYTES NFR BLD AUTO: 6.9 % — SIGNIFICANT CHANGE UP (ref 2–14)
MONOCYTES NFR BLD AUTO: 7.3 % — SIGNIFICANT CHANGE UP (ref 2–14)
NEUTROPHILS # BLD AUTO: 7.86 K/UL — HIGH (ref 1.8–7.4)
NEUTROPHILS # BLD AUTO: 7.98 K/UL — HIGH (ref 1.8–7.4)
NEUTROPHILS # BLD AUTO: 8.15 K/UL — HIGH (ref 1.8–7.4)
NEUTROPHILS NFR BLD AUTO: 75.6 % — SIGNIFICANT CHANGE UP (ref 43–77)
NEUTROPHILS NFR BLD AUTO: 76.8 % — SIGNIFICANT CHANGE UP (ref 43–77)
NEUTROPHILS NFR BLD AUTO: 76.9 % — SIGNIFICANT CHANGE UP (ref 43–77)
NITRITE UR-MCNC: NEGATIVE — SIGNIFICANT CHANGE UP
NRBC # FLD: 0 K/UL — SIGNIFICANT CHANGE UP (ref 0–0)
PH UR: 6.5 — SIGNIFICANT CHANGE UP (ref 5–8)
PLATELET # BLD AUTO: 264 K/UL — SIGNIFICANT CHANGE UP (ref 150–400)
PLATELET # BLD AUTO: 266 K/UL — SIGNIFICANT CHANGE UP (ref 150–400)
PLATELET # BLD AUTO: 276 K/UL — SIGNIFICANT CHANGE UP (ref 150–400)
PMV BLD: 10.6 FL — SIGNIFICANT CHANGE UP (ref 7–13)
PMV BLD: 10.7 FL — SIGNIFICANT CHANGE UP (ref 7–13)
PMV BLD: 10.9 FL — SIGNIFICANT CHANGE UP (ref 7–13)
POTASSIUM SERPL-MCNC: 5 MMOL/L — SIGNIFICANT CHANGE UP (ref 3.5–5.3)
POTASSIUM SERPL-MCNC: 5.1 MMOL/L — SIGNIFICANT CHANGE UP (ref 3.5–5.3)
POTASSIUM SERPL-MCNC: 5.6 MMOL/L — HIGH (ref 3.5–5.3)
POTASSIUM SERPL-SCNC: 5 MMOL/L — SIGNIFICANT CHANGE UP (ref 3.5–5.3)
POTASSIUM SERPL-SCNC: 5.1 MMOL/L — SIGNIFICANT CHANGE UP (ref 3.5–5.3)
POTASSIUM SERPL-SCNC: 5.6 MMOL/L — HIGH (ref 3.5–5.3)
PROT SERPL-MCNC: 5.5 G/DL — LOW (ref 6–8.3)
PROT SERPL-MCNC: 5.8 G/DL — LOW (ref 6–8.3)
PROT SERPL-MCNC: 5.8 G/DL — LOW (ref 6–8.3)
PROT UR-MCNC: 100 — HIGH
PROTHROM AB SERPL-ACNC: 9.7 SEC — LOW (ref 9.8–13.1)
PROTHROM AB SERPL-ACNC: 9.8 SEC — SIGNIFICANT CHANGE UP (ref 9.8–13.1)
RBC # BLD: 2.44 M/UL — LOW (ref 3.8–5.2)
RBC # BLD: 2.51 M/UL — LOW (ref 3.8–5.2)
RBC # BLD: 2.55 M/UL — LOW (ref 3.8–5.2)
RBC # FLD: 14.2 % — SIGNIFICANT CHANGE UP (ref 10.3–14.5)
RBC # FLD: 14.2 % — SIGNIFICANT CHANGE UP (ref 10.3–14.5)
RBC # FLD: 14.3 % — SIGNIFICANT CHANGE UP (ref 10.3–14.5)
RBC CASTS # UR COMP ASSIST: SIGNIFICANT CHANGE UP (ref 0–?)
REVIEW TO FOLLOW: YES — SIGNIFICANT CHANGE UP
SODIUM SERPL-SCNC: 134 MMOL/L — LOW (ref 135–145)
SP GR SPEC: 1.01 — SIGNIFICANT CHANGE UP (ref 1–1.04)
SQUAMOUS # UR AUTO: SIGNIFICANT CHANGE UP
TROPONIN T, HIGH SENSITIVITY: 21 NG/L — SIGNIFICANT CHANGE UP (ref ?–14)
TROPONIN T, HIGH SENSITIVITY: 21 NG/L — SIGNIFICANT CHANGE UP (ref ?–14)
URATE SERPL-MCNC: 8.5 MG/DL — HIGH (ref 2.5–7)
URATE SERPL-MCNC: 8.9 MG/DL — HIGH (ref 2.5–7)
URATE SERPL-MCNC: 9 MG/DL — HIGH (ref 2.5–7)
UROBILINOGEN FLD QL: NORMAL — SIGNIFICANT CHANGE UP
WBC # BLD: 10.38 K/UL — SIGNIFICANT CHANGE UP (ref 3.8–10.5)
WBC # BLD: 10.4 K/UL — SIGNIFICANT CHANGE UP (ref 3.8–10.5)
WBC # BLD: 10.62 K/UL — HIGH (ref 3.8–10.5)
WBC # FLD AUTO: 10.38 K/UL — SIGNIFICANT CHANGE UP (ref 3.8–10.5)
WBC # FLD AUTO: 10.4 K/UL — SIGNIFICANT CHANGE UP (ref 3.8–10.5)
WBC # FLD AUTO: 10.62 K/UL — HIGH (ref 3.8–10.5)
WBC UR QL: SIGNIFICANT CHANGE UP (ref 0–?)

## 2020-02-18 PROCEDURE — 71045 X-RAY EXAM CHEST 1 VIEW: CPT | Mod: 26

## 2020-02-18 PROCEDURE — 93306 TTE W/DOPPLER COMPLETE: CPT | Mod: 26

## 2020-02-18 PROCEDURE — 93010 ELECTROCARDIOGRAM REPORT: CPT

## 2020-02-18 PROCEDURE — 70450 CT HEAD/BRAIN W/O DYE: CPT | Mod: 26

## 2020-02-18 RX ORDER — SIMETHICONE 80 MG/1
160 TABLET, CHEWABLE ORAL ONCE
Refills: 0 | Status: COMPLETED | OUTPATIENT
Start: 2020-02-18 | End: 2020-02-18

## 2020-02-18 RX ORDER — ACETAMINOPHEN 500 MG
1000 TABLET ORAL ONCE
Refills: 0 | Status: COMPLETED | OUTPATIENT
Start: 2020-02-18 | End: 2020-02-18

## 2020-02-18 RX ORDER — NIFEDIPINE 30 MG
90 TABLET, EXTENDED RELEASE 24 HR ORAL DAILY
Refills: 0 | Status: DISCONTINUED | OUTPATIENT
Start: 2020-02-18 | End: 2020-02-20

## 2020-02-18 RX ORDER — OXYCODONE AND ACETAMINOPHEN 5; 325 MG/1; MG/1
1 TABLET ORAL EVERY 4 HOURS
Refills: 0 | Status: DISCONTINUED | OUTPATIENT
Start: 2020-02-18 | End: 2020-02-18

## 2020-02-18 RX ORDER — PANTOPRAZOLE SODIUM 20 MG/1
40 TABLET, DELAYED RELEASE ORAL DAILY
Refills: 0 | Status: DISCONTINUED | OUTPATIENT
Start: 2020-02-18 | End: 2020-02-21

## 2020-02-18 RX ORDER — GLYCERIN ADULT
1 SUPPOSITORY, RECTAL RECTAL AT BEDTIME
Refills: 0 | Status: DISCONTINUED | OUTPATIENT
Start: 2020-02-18 | End: 2020-02-21

## 2020-02-18 RX ORDER — MAGNESIUM HYDROXIDE 400 MG/1
30 TABLET, CHEWABLE ORAL
Refills: 0 | Status: DISCONTINUED | OUTPATIENT
Start: 2020-02-18 | End: 2020-02-21

## 2020-02-18 RX ORDER — LABETALOL HCL 100 MG
300 TABLET ORAL THREE TIMES A DAY
Refills: 0 | Status: DISCONTINUED | OUTPATIENT
Start: 2020-02-18 | End: 2020-02-21

## 2020-02-18 RX ORDER — LANOLIN
1 OINTMENT (GRAM) TOPICAL EVERY 6 HOURS
Refills: 0 | Status: DISCONTINUED | OUTPATIENT
Start: 2020-02-18 | End: 2020-02-21

## 2020-02-18 RX ORDER — SIMETHICONE 80 MG/1
80 TABLET, CHEWABLE ORAL EVERY 4 HOURS
Refills: 0 | Status: DISCONTINUED | OUTPATIENT
Start: 2020-02-18 | End: 2020-02-21

## 2020-02-18 RX ORDER — LEVETIRACETAM 250 MG/1
500 TABLET, FILM COATED ORAL
Refills: 0 | Status: DISCONTINUED | OUTPATIENT
Start: 2020-02-18 | End: 2020-02-19

## 2020-02-18 RX ORDER — HEPARIN SODIUM 5000 [USP'U]/ML
5000 INJECTION INTRAVENOUS; SUBCUTANEOUS EVERY 12 HOURS
Refills: 0 | Status: DISCONTINUED | OUTPATIENT
Start: 2020-02-18 | End: 2020-02-21

## 2020-02-18 RX ORDER — LABETALOL HCL 100 MG
300 TABLET ORAL THREE TIMES A DAY
Refills: 0 | Status: DISCONTINUED | OUTPATIENT
Start: 2020-02-18 | End: 2020-02-18

## 2020-02-18 RX ORDER — CITRIC ACID/SODIUM CITRATE 300-500 MG
30 SOLUTION, ORAL ORAL ONCE
Refills: 0 | Status: DISCONTINUED | OUTPATIENT
Start: 2020-02-18 | End: 2020-02-18

## 2020-02-18 RX ORDER — OXYCODONE AND ACETAMINOPHEN 5; 325 MG/1; MG/1
2 TABLET ORAL EVERY 6 HOURS
Refills: 0 | Status: DISCONTINUED | OUTPATIENT
Start: 2020-02-18 | End: 2020-02-18

## 2020-02-18 RX ORDER — ACETAMINOPHEN 500 MG
975 TABLET ORAL EVERY 6 HOURS
Refills: 0 | Status: DISCONTINUED | OUTPATIENT
Start: 2020-02-18 | End: 2020-02-21

## 2020-02-18 RX ORDER — FAMOTIDINE 10 MG/ML
20 INJECTION INTRAVENOUS
Refills: 0 | Status: DISCONTINUED | OUTPATIENT
Start: 2020-02-18 | End: 2020-02-18

## 2020-02-18 RX ORDER — OXYCODONE AND ACETAMINOPHEN 5; 325 MG/1; MG/1
1 TABLET ORAL ONCE
Refills: 0 | Status: DISCONTINUED | OUTPATIENT
Start: 2020-02-18 | End: 2020-02-18

## 2020-02-18 RX ADMIN — SIMETHICONE 80 MILLIGRAM(S): 80 TABLET, CHEWABLE ORAL at 21:34

## 2020-02-18 RX ADMIN — Medication 90 MILLIGRAM(S): at 23:11

## 2020-02-18 RX ADMIN — Medication 1000 MILLIGRAM(S): at 15:57

## 2020-02-18 RX ADMIN — Medication 975 MILLIGRAM(S): at 22:37

## 2020-02-18 RX ADMIN — HEPARIN SODIUM 5000 UNIT(S): 5000 INJECTION INTRAVENOUS; SUBCUTANEOUS at 22:04

## 2020-02-18 RX ADMIN — Medication 400 MILLIGRAM(S): at 14:52

## 2020-02-18 RX ADMIN — Medication 975 MILLIGRAM(S): at 22:07

## 2020-02-18 RX ADMIN — Medication 1000 MILLIGRAM(S): at 07:25

## 2020-02-18 RX ADMIN — LEVETIRACETAM 500 MILLIGRAM(S): 250 TABLET, FILM COATED ORAL at 18:39

## 2020-02-18 RX ADMIN — OXYCODONE AND ACETAMINOPHEN 1 TABLET(S): 5; 325 TABLET ORAL at 03:17

## 2020-02-18 RX ADMIN — LEVETIRACETAM 500 MILLIGRAM(S): 250 TABLET, FILM COATED ORAL at 06:54

## 2020-02-18 RX ADMIN — Medication 300 MILLIGRAM(S): at 06:23

## 2020-02-18 RX ADMIN — OXYCODONE AND ACETAMINOPHEN 1 TABLET(S): 5; 325 TABLET ORAL at 04:08

## 2020-02-18 RX ADMIN — SIMETHICONE 80 MILLIGRAM(S): 80 TABLET, CHEWABLE ORAL at 14:52

## 2020-02-18 RX ADMIN — Medication 300 MILLIGRAM(S): at 22:04

## 2020-02-18 RX ADMIN — SIMETHICONE 160 MILLIGRAM(S): 80 TABLET, CHEWABLE ORAL at 03:39

## 2020-02-18 RX ADMIN — Medication 400 MILLIGRAM(S): at 06:58

## 2020-02-18 RX ADMIN — Medication 300 MILLIGRAM(S): at 14:52

## 2020-02-18 NOTE — H&P ADULT - ASSESSMENT
33 y/o [place] patient, now para 2012 POD 5 s/p scheduled repeat LTCS @ 37+4 wks for siPEC with severe features P/C ratio 3.0 on 2/13/2020  Post partum readmit for Blood pressure management   Report off to Dr Maya, PGY-3

## 2020-02-18 NOTE — PROGRESS NOTE ADULT - ASSESSMENT
Gen: NAD; A+O X3  Neuro exam: intact.   Cardiac: R/R/R, normal S1, S2  Pulm: CTAB  Abdomen: obese, soft, non-tender, non-distended, +bowel sounds. Incision: steri strips CDI.   DTRs: 3+, no clonus  Extremities: +1 pitting edema, strong pulses 2+ palpated BLE.  VS--BP: 167/90, repeated after 10 minutes 157/71, P 67, RR 18, T 36.4  BP trends: 167/90, 157/71, 154/70, 149/65, 151/76, 150/69, 156/73    0115: Spoke to Neuro resident, Dr Son for consult, recommended CT head with CTV, will be in for bedside evaluation  0130-Spoke with radiology to protocol CT and spoke to CT tech to expedite scan.   0158-Dr Son (Neuro) at the bedside for evaluation. Gen: NAD; A+O X3  Neuro exam: intact.   Cardiac: R/R/R, normal S1, S2  Pulm: CTAB  Abdomen: obese, soft, non-tender, non-distended, +bowel sounds. Incision: steri strips CDI.   DTRs: 3+, no clonus  Extremities: +1 pitting edema, strong pulses 2+ palpated BLE.  VS--BP: 167/90, repeated after 10 minutes 157/71, P 67, RR 18, T 36.4  BP trends: 167/90, 157/71, 154/70, 149/65, 151/76, 150/69, 156/73  Complete 12-point ROS was done and were all negative other than HPI.     0115: Spoke to Neuro resident, Dr Son for consult, recommended CT head with CTV, will be in for bedside evaluation  0130-Spoke with radiology to protocol CT and spoke to CT tech to expedite scan.   0158-Dr Son (Neuro) at the bedside for evaluation. Gen: NAD; A+O X3  Neuro exam: intact.   Cardiac: R/R/R, normal S1, S2  Pulm: CTAB  Abdomen: obese, soft, non-tender, non-distended, +bowel sounds. Incision: steri strips CDI.   DTRs: 3+, no clonus  Extremities: +1 pitting edema, strong pulses 2+ palpated BLE.  VS--BP: 167/90, repeated after 10 minutes 157/71, P 67, RR 18, T 36.4  BP trends: 167/90, 157/71, 154/70, 149/65, 151/76, 150/69, 156/73  Complete 12-point ROS was done and were all negative other than HPI.     Addendum  0445-Patient received Percocet one tab at 3:17 am and simethicone @ 3:39am, reports relief of back and incisional pain, but persistent right sided chest/shoulder pain 8/10 on pain scale. EKG obtained: Normal sinus rhythm with sinus arrythmia, possible left atrial enlargement. Cardiology called for consult and EKG read.   Labs resulted: BUN/creatinine trending down. A drop in H/H from 9.5/29.2 POD 1 to 7.5/23.7, no other comparisons; EBL @ delivery 800ml with QBL 430mL-Pt asymptomatic, no acute abdomen. Preliminary head CT: no emergent findings, f/u report in AM.   Dr Crane notified of all above findings  Plan:  Admit to floor  -increase Labetalol to 300 mg TID  -Maintain Procardia 90 mg QD  -Start Keppra 500mg BID  -Cardiology consult      Timeline  0115: Spoke to Neuro resident, Dr Son for consult, recommended CT head with CTV, will be in for bedside evaluation  0130-Spoke with radiology to protocol CT and spoke to CT tech to expedite scan.   0158-Dr Son (Neuro) at the bedside for evaluation.   0430-Cardiology called for consult and EKG read Gen: NAD; A+O X3  Neuro exam: intact.   Cardiac: R/R/R, normal S1, S2  Pulm: CTAB  Abdomen: obese, soft, non-tender, non-distended, +bowel sounds. Incision: steri strips CDI.   DTRs: 3+, no clonus  Extremities: +1 pitting edema, strong pulses 2+ palpated BLE.  VS--BP: 167/90, repeated after 10 minutes 157/71, P 67, RR 18, T 36.4  BP trends: 167/90, 157/71, 154/70, 149/65, 151/76, 150/69, 156/73  Complete 12-point ROS was done and were all negative other than HPI.     Addendum  0445-Patient received Percocet one tab at 3:17 am and simethicone @ 3:39am, reports relief of back and incisional pain, but persistent right sided chest/shoulder pain 8/10 on pain scale. EKG obtained: Normal sinus rhythm with sinus arrythmia, possible left atrial enlargement. Cardiology called for consult and EKG read.   Labs resulted: BUN/creatinine trending down. A drop in H/H from 9.5/29.2 POD 1 to 7.5/23.7, no other comparisons; EBL @ delivery 800ml with QBL 430mL-Pt asymptomatic, no acute abdomen. Preliminary head CT: no emergent findings, f/u report in AM.   Dr Crane notified of all above findings  Plan:  Admit to floor  -increase Labetalol to 300 mg TID  -Maintain Procardia 90 mg QD  -Start Keppra 500mg BID  -Cardiology consult  Report off to Dr Maya, PGY-3       Timeline  0115: Spoke to Neuro resident, Dr Son for consult, recommended CT head with CTV, will be in for bedside evaluation  0130-Spoke with radiology to protocol CT and spoke to CT tech to expedite scan.   0158-Dr Son (Neuro) at the bedside for evaluation.   0430-Cardiology called for consult and EKG read Gen: NAD; A+O X3  Neuro exam: intact.   Cardiac: R/R/R, normal S1, S2  Pulm: CTAB  Abdomen: obese, soft, non-tender, non-distended, +bowel sounds. Incision: steri strips CDI.   DTRs: 3+, no clonus  Extremities: +1 pitting edema, strong pulses 2+ palpated BLE.  VS--BP: 167/90, repeated after 10 minutes 157/71, P 67, RR 18, T 36.4  BP trends: 167/90, 157/71, 154/70, 149/65, 151/76, 150/69, 156/73  Complete 12-point ROS was done and were all negative other than HPI.     Addendum  0445-Patient received Percocet one tab at 3:17 am and simethicone @ 3:39am, reports relief of headache, back and incisional pain, but persistent right sided chest/shoulder pain 8/10 on pain scale. EKG obtained: Normal sinus rhythm with sinus arrythmia, possible left atrial enlargement. Cardiology called for consult and EKG read.   Labs resulted: BUN/creatinine trending down. A drop in H/H from 9.5/29.2 POD 1 to 7.5/23.7, no other comparisons; EBL @ delivery 800ml with QBL 430mL-Pt asymptomatic, no acute abdomen. Preliminary head CT: no emergent findings, f/u report in AM.   Dr Crane notified of all above findings  Plan:  Admit to floor  -increase Labetalol to 300 mg TID  -Maintain Procardia 90 mg QD  -Start Keppra 500mg BID  -Cardiology consult  Report off to Dr Maya, PGY-3       Timeline  0115: Spoke to Neuro resident, Dr Son for consult, recommended CT head with CTV, will be in for bedside evaluation  0130-Spoke with radiology to protocol CT and spoke to CT tech to expedite scan.   0158-Dr Son (Neuro) at the bedside for evaluation.   0430-Cardiology called for consult and EKG read

## 2020-02-18 NOTE — CONSULT NOTE ADULT - ASSESSMENT
34 year old female, pmhx migraines,  POD 5 from  pw headache of three hours, dull, intermittent, 5/10 pain, behind left eye, but moved to right eye and back over the past three hours.  No change in vision.  Also with abdominal "gas" pain, and right shoulder pain.  Now with mild nausea and photophobia. In chart patient had headache before and after delivery, but she denies it at time of interview.  Migraine hx since teenage years, no aura, wasn't able to describe pain, onset, duration of migraines as she has not had one "in many years."    Impression:  Headache of unclear duration iso POD 5 from C section.  Concern for venous thrombosis or other secondary headache disorder.  - CTH / CTA / CTV head   - IVF hydration  - reglan 10  - tylenol  - toradol 30 if pain worsens

## 2020-02-18 NOTE — CONSULT NOTE ADULT - SUBJECTIVE AND OBJECTIVE BOX
In-House Cardiology Consult Note  ---------------------------------------------    Patient seen and evaluated at bedside in [facility] PACU bed 6.    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 She is readmitted at this time because she experienced intolerable chest pains and pain at  incision site. She reports that the pain started about 1 day after her  and has been progressively been getting worse since that time. It is focal, right sided and constant with occasional radiation to the right shoulder, sharp in character. Exacerbated by lying flat and on deep inspiration. Alleviated by sitting upright.     For her Hx of HTN, she takes labetalol 200mg TID and Procardia 90mg daily at home. She reports that blood pressures at home @ 2pm 160/85 and 180/95 @ 7pm. Last dose of Labetalol @ 7:30pm and Procardia @ 9:30pm. For the chest pain, a head ache from her blood pressures, and uncontrolled hypertension, she presented back to the [facility] ED for further evaluation, and is now admitted for further management. At this time, she denies SOB, palpitations, peripheral swelling.     PMHx:   HTN (hypertension)    PSHx:   History of  section ( and )  Status post appendectomy    Allergies:  No Known Allergies    Home Meds:  Labetalol 200mg TID; Procardia 90mg daily.    Current Medications:   acetaminophen   Tablet .. 975 milliGRAM(s) Oral every 6 hours PRN  acetaminophen  IVPB .. 1000 milliGRAM(s) IV Intermittent once  glycerin Suppository - Adult 1 Suppository(s) Rectal at bedtime PRN  labetalol 300 milliGRAM(s) Oral three times a day  lanolin Ointment 1 Application(s) Topical every 6 hours PRN  levETIRAcetam 500 milliGRAM(s) Oral two times a day  magnesium hydroxide Suspension 30 milliLiter(s) Oral two times a day PRN  NIFEdipine XL 90 milliGRAM(s) Oral daily  simethicone 80 milliGRAM(s) Chew every 4 hours PRN    FAMILY HISTORY:  Asthma (Mother)  Family history of heart attack (Father)  Hypertension (Father)  Diabetes mellitus (Father)    Social History:  Smoking History: Denies  Alcohol Use: Denies  Drug Use: Denies    REVIEW OF SYSTEMS:  CONSTITUTIONAL: No weakness, fevers or chills  EYES/ENT: No visual changes;  No dysphagia  RESPIRATORY: No cough, wheezing, hemoptysis; No shortness of breath  CARDIOVASCULAR: Constant chest pain.  GASTROINTESTINAL: No abdominal or epigastric pain. No nausea, vomiting, or hematemesis; No diarrhea or constipation. No melena or hematochezia.  BACK: No back pain  GENITOURINARY: No dysuria, frequency or hematuria  NEUROLOGICAL: No numbness or weakness  SKIN: No itching, burning, rashes, or lesions   All other review of systems is negative unless indicated above.    Physical Exam:  T(F): 98.2 (-), Max: 98.2 ()  HR: 78 () (68 - 82)  BP: 135/82 () (121/77 - 144/83)  RR: 23 (-)  SpO2: 97% ()  GENERAL: No acute distress, well-developed  HEAD:  Atraumatic, Normocephalic  ENT: EOMI, PERRLA, conjunctiva and sclera clear, Neck supple, mild to moderate JVD, moist mucosa  CHEST/LUNG: Clear to auscultation bilaterally; No wheeze, equal breath sounds bilaterally   HEART: Regular rate and rhythm; No murmurs, rubs, or gallops  ABDOMEN: Soft, Nontender, Nondistended; Bowel sounds present  EXTREMITIES:  +1 pitting edema up to midshins  PSYCH: Nl behavior, nl affect  NEUROLOGY: AAOx3, non-focal, cranial nerves intact  SKIN: Normal color, No rashes or lesions    CXR: Personally reviewed    Labs: Personally reviewed                        7.4    10.40 )-----------( 276      ( 2020 09:13 )             23.2     -    134<L>  |  107  |  44<H>  ----------------------------<  90  5.6<H>   |  16<L>  |  2.39<H>    Ca    8.6      2020 01:18    TPro  5.8<L>  /  Alb  2.7<L>  /  TBili  < 0.2<L>  /  DBili  x   /  AST  29  /  ALT  28  /  AlkPhos  85  02-18    PT/INR - ( 2020 09:13 )   PT: 9.8 SEC;   INR: 0.88          PTT - ( 2020 09:13 )  PTT:28.5 SEC

## 2020-02-18 NOTE — H&P ADULT - PROBLEM SELECTOR PLAN 1
Admit to floor  -increase Labetalol to 300 mg TID  -Maintain Procardia 90 mg QD  -Start Keppra 500mg BID  -Cardiology consult Admit to floor  -increase Labetalol to 300 mg TID  -Maintain Procardia 90 mg QD  -Start Keppra 500mg BID  -AM CBC  -Cardiology consult

## 2020-02-18 NOTE — CONSULT NOTE ADULT - SUBJECTIVE AND OBJECTIVE BOX
HPI:    34 year old female, pmhx migraines,  POD 5 from  pw headache of three hours, dull, intermittent, 5/10 pain, behind left eye, but moved to right eye and back over the past three hours.  No change in vision.  Also with abdominal "gas" pain, and right shoulder pain.  Now with mild nausea and photophobia. In chart patient had headache before and after delivery, but she denies it at time of interview.  Migraine hx since teenage years, no aura, wasn't able to describe pain, onset, duration of migraines as she has not had one "in many years."    REVIEW OF SYSTEMS  General:	  Skin/Breast:	  Ophthalmologic:  ENMT:	  Respiratory and Thorax:	  Cardiovascular:	  Gastrointestinal:	  Genitourinary:	  Musculoskeletal:	  Neurological:	  Psychiatric:	  Hematology/Lymphatics:	  Endocrine:	  Allergic/Immunologic:	    A 10-system ROS was performed and is negative except for those items noted above and/or in the HPI.    PAST MEDICAL & SURGICAL HISTORY:  Pregnancy  HTN (hypertension)  History of  section  Status post appendectomy    FAMILY HISTORY:  Asthma (Mother)  Family history of heart attack (Father)  Hypertension (Father)  Diabetes mellitus (Father)    SOCIAL HISTORY:   T/E/D:   Occupation:   Lives with:     MEDICATIONS (HOME):  Home Medications:  acetaminophen 325 mg oral tablet: 3 tab(s) orally , As Needed (2020 21:39)  labetalol 200 mg oral tablet: 1 tab(s) orally 3 times a day (2020 09:54)  NIFEdipine 90 mg oral tablet, extended release: 1 tab(s) orally once a day (2020 09:54)    MEDICATIONS  (STANDING):    MEDICATIONS  (PRN):    ALLERGIES/INTOLERANCES:  Allergies  No Known Allergies    Intolerances    VITALS & EXAMINATION:  Vital Signs Last 24 Hrs  T(C): 36.8 (2020 05:16), Max: 36.8 (2020 05:16)  T(F): 98.2 (2020 05:16), Max: 98.2 (2020 05:16)  HR: 89 (2020 05:16) (89 - 89)  BP: 131/78 (2020 05:16) (131/78 - 131/78)  BP(mean): --  RR: 16 (2020 05:16) (16 - 16)  SpO2: 97% (2020 05:16) (97% - 97%)    Neurological Exam      Neurological Exam:  Mental Status: AOx3  Cranial Nerves:   Pupils: PERRL  EOM: intact  Nystagmus: none  Visual Fields: full   V1-3: intact  Facial Droop: none   Pallate and Uvula: midline  Dysarthria: none  Head Turn Strength: normal  Shoulder Shrug: normal  Tone:  normal  Motor: Moving all extremities  Sensation: intact LT x 4  Coordination: FTN normal b/l     LABORATORY:  CBC                       7.5    10.62 )-----------( 264      ( 2020 01:18 )             23.7     Chem 18    134<L>  |  107  |  44<H>  ----------------------------<  90  5.6<H>   |  16<L>  |  2.39<H>    Ca    8.6      2020 01:18    TPro  5.8<L>  /  Alb  2.7<L>  /  TBili  < 0.2<L>  /  DBili  x   /  AST  29  /  ALT  28  /  AlkPhos  85  -18    LFTs LIVER FUNCTIONS - ( 2020 01:18 )  Alb: 2.7 g/dL / Pro: 5.8 g/dL / ALK PHOS: 85 u/L / ALT: 28 u/L / AST: 29 u/L / GGT: x           Coagulopathy PT/INR - ( 2020 01:18 )   PT: 9.7 SEC;   INR: 0.88          PTT - ( 2020 01:18 )  PTT:25.6 SEC  Lipid Panel  Chol 250<H> LDL -- HDL 53 Trig 252<H>  A1c   Cardiac enzymes     U/A   CSF  Immunological  Other    STUDIES & IMAGING:  Studies (EKG, EEG, EMG, etc):     Radiology (XR, CT, MR, U/S, TTE/BENJIE):

## 2020-02-18 NOTE — PROGRESS NOTE ADULT - SUBJECTIVE AND OBJECTIVE BOX
Referred by: Andrea Allen MD; Medical Diagnosis (from order):    Diagnosis Information      Diagnosis    737.39 (ICD-9-CM) - M41.86 (ICD-10-CM) - Other forms of scoliosis, lumbar region                Physical Therapy -  Daily Treatment Note    Visit:  5   Next referring provider appointment: 11/21/2019        SUBJECTIVE                                                                                                              Pt reports R sciatic is a lot better.   Groin pain is still constant dull roar W/ each step .   Pain  Is there constantly  1/10 at rest /walking 3/10.   Also R LS pain which each steps and does feel the \"waddle\" in the gait pattern.   Pt does plan to retire and announces 16 days left .    Pt feels that she is improving but she does need to walk up the stairs using a laundry basket as support on each step.        Pain / Symptoms:  Pain/symptom is: constant  Pain rating (out of 10): Current: 1 ; Best: 1; Worst: 3  Location: L groin and R LS      OBJECTIVE                                                                                                                     Range of Motion (ROM) (norms in parentheses, measurement in degrees unless noted):     Knee Flexion in prone: Left: Active: 127 Passive: 133 Right: Active: 126 Passive: 133        TREATMENT                                                                                                                  Therapeutic Exercise:  Sitting hip AB / add isometric x 10 *  Clams were too painful so hold for now   Nerve glides in sitting and supine*   Bridges x 10 1sets*  Bridge w/ AB  Side step using yellow band , tension for retro/ forward   Stretches hip flex *   Lumbar flex stretch rounding / flexing into standing MECCA stretch position**   core stablity w/ march x 10  Incline stretch L 3    Manual Therapy:  MWM for standing L hip  Lat glide                                       Lat rot x 10   \"        NT  Hip EXT   B MWM       Long  Patient of Dr Mccoy.  33 y/o [place] patient, now para 2012 POD 5 s/p scheduled repeat c/s @ 37+4 wks for siPEC with severe features P/C ratio 3.0 on 2020, discharged home yesterday 20 @ noon. Patient also with increased BUN/Creatinine, follows by nephrology.   Presents to triage with c/o elevated blood pressures, headache, nausea, gas pain radiating to right shoulder and severe back pain x 2 days. Pt with h/o CHTN on labetalol 200mg TID and Procardia, Pt reports blood pressures at home @ 2pm 160/85 and 180/95 @ 7pm. Last dose of Labetalol @ 7:30pm and Procardia @ 9:30pm. Pt states sharp frontal headache onset at 11pm, had left ocular pressure, but now right ocular pressure with photophobia and nausea without vomiting. Pt reports pain 5/10 on pain scale. Pt denies any visual changes, focal deficit, states SOB with gas pain and denies epigastric pain.     Allergies: NKDA  Meds: Labetalol 200mg TID; Procardia 90mg daily.  PMH: Chronic hypertension  PSH: c/s x2 ('17; '20); Appendectomy ('06)  OBgynHx    -Complete SAB @ 8 wks.  2017-Primary c/s @ 38 wks for malpresentation. Female, 6.6# (PP SPEC/MgSO4)  2020-Repeat c/s @ 37+4 wks. siPEC. Female, 5.3# (MgSO4)  Pt denies any h/o: Abn. PAP, ovarian cysts, fibroids, breast problems, STDs/STIs  PSY: denies  EtOH/ smoke/ recreational substance use: denies  FH: Diabetes, hypertension (Father) axis traction manually x 3  At 30 seconds for the LLE  Not today    Resultant in more rom in ALLYSON position on 10-15-19     postlation in sitting  X 3  And standing *  NTMan therapy over the R piriformis and use of foam roll *  Shotgun technique for pube alignment w/ good result  Trial of  Sheet traction  No better NT  Contract relax for LLD  Fair result NT  C r for the L hip flex tightnessNT  Therapeutic Activity:   Not today on [facility] ball stabilty ex  Sitting bounce *                                  Hula*                                  Sit UE reaches *         4 pt ue and LE lifts*  Side step*  Standing rotation for hip L IR x 10    bilat rotation in stand to 10 and 2 oclocks  Step stance r foot on 2nd step / hip flex L stretches*  Stretches for hip flex , standing on chair , stairs or 1/2 kneel  DYNAMIC WALK, MARCH, side , monstergoose   Hip ext , abd vs yellow loop x 10  STEP up 4\"              Up and lat across x 10      ASSESSMENT                                                                                                                 Pt arrives with less pain overall although conts to demonstrate antalgic gait and lordotic position in stance and ambulation.   Did include some more dynamic drill for strengthening and will be adding 4 inch step ups, more advance bridge ( w/ AB)  Along w/ core strengthening per pt tolerance.   PLAN is to conts to upgrade HEP as  Tolerance allows.   See 1x/wk for up to 2-4 additional sessions.    GOALS                                                                                                                       Long Term Goals: To be met by end of plan of care:      Home Exercise Program: Independent with progressed and modified home exercise program (HEP)      Status:  Progressing/ongoing    Improve score on the Oswestry to 31% or better     Status:  Progressing/ongoing  Improve rom/ strength in the L hip to allow for less antalgia and reciprical gait on  stairs       Status:  Progressing/ongoing      Procedures and total treatment time documented Time Entry flowsheet.     Patient of Dr Mccoy.  33 y/o  patient, now para 2012 POD 5 s/p scheduled repeat c/s @ 37+4 wks for siPEC with severe features P/C ratio 3.0 on 2020, discharged home yesterday 20 @ noon. Patient also with increased BUN/Creatinine, follows by nephrology.   Presents to triage with c/o elevated blood pressures, headache, nausea, gas pain radiating to right shoulder x 2 days and severe back pain since delivery. Pt with h/o CHTN on labetalol 200mg TID and Procardia, Pt reports blood pressures at home @ 2pm 160/85 and 180/95 @ 7pm. Last dose of Labetalol @ 7:30pm and Procardia @ 9:30pm. Pt states sharp frontal headache onset at 11pm, had left ocular pressure, but now right ocular pressure with photophobia and nausea without vomiting. Pt reports pain 5/10 on pain scale. Pt denies any visual changes, focal deficit, states SOB with gas pain and denies epigastric pain.     Allergies: NKDA  Meds: Labetalol 200mg TID; Procardia 90mg daily.  PMH: Chronic hypertension; migraines   PSH: c/s x2 ('17; '20); Appendectomy ('06)  OBgynHx    -Complete SAB @ 8 wks.  2017-Primary c/s @ 38 wks for malpresentation. Female, 6.6# (PP SPEC/MgSO4)  2020-Repeat c/s @ 37+4 wks. siPEC. Female, 5.3# (MgSO4)  Pt denies any h/o: Abn. PAP, ovarian cysts, fibroids, breast problems, STDs/STIs  PSY: denies  EtOH/ smoke/ recreational substance use: denies  FH: Diabetes, hypertension (Father) Patient of Dr Mccoy.  35 y/o [place] patient, now para 2012 POD 5 s/p scheduled repeat c/s @ 37+4 wks for siPEC with severe features P/C ratio 3.0 on 2020, discharged home yesterday 20 @ noon. Patient also with increased BUN/Creatinine, follows by nephrology.   Presents to triage with c/o elevated blood pressures, headache, nausea, gas pain radiating to right shoulder x 2 days and severe back pain since delivery. Pt with h/o CHTN on labetalol 200mg TID and Procardia, Pt reports blood pressures at home @ 2pm 160/85 and 180/95 @ 7pm. Last dose of Labetalol @ 7:30pm and Procardia @ 9:30pm. Pt states sharp frontal headache onset at 11pm, had left ocular pressure, but now right ocular pressure with photophobia and nausea without vomiting. Pt reports pain 5/10 on pain scale. Pt denies any visual changes, focal deficit, states SOB with gas pain and denies epigastric pain.     Allergies: NKDA  Meds: Labetalol 200mg TID; Procardia 90mg daily.  PMH: Chronic hypertension; migraines (denies migraines since college)  PSH: c/s x2 ('17; '20); Appendectomy ('06)  OBgynHx    -Complete SAB @ 8 wks.  2017-Primary c/s @ 38 wks for malpresentation. Female, 6.6# (PP SPEC/MgSO4)  2020-Repeat c/s @ 37+4 wks. siPEC. Female, 5.3# (MgSO4)  Pt denies any h/o: Abn. PAP, ovarian cysts, fibroids, breast problems, STDs/STIs  PSY: denies  EtOH/ smoke/ recreational substance use: denies  FH: Diabetes, hypertension (Father) Patient of Dr Mccoy.  35 y/o [place] patient, now para 2012 POD 5 s/p scheduled repeat c/s @ 37+4 wks for siPEC with severe features P/C ratio 3.0 on 2020, discharged home yesterday 20 @ noon. Patient also with increased BUN/Creatinine, follows by nephrology.   Presents to triage with c/o elevated blood pressures, headache, nausea, "gas pain" radiating to right chest and shoulder x 2 days and severe back pain and incisional pain since delivery. Pt with h/o CHTN on labetalol 200mg TID and Procardia, Pt reports blood pressures at home @ 2pm 160/85 and 180/95 @ 7pm. Last dose of Labetalol @ 7:30pm and Procardia @ 9:30pm. Pt states sharp frontal headache onset at 11pm, had left ocular pressure, but now right ocular pressure with photophobia and nausea without vomiting. Pt reports pain 5/10 on pain scale. Pt denies any visual changes, focal deficit, states SOB with gas pain and denies epigastric pain.     Allergies: NKDA  Meds: Labetalol 200mg TID; Procardia 90mg daily.  PMH: Chronic hypertension; migraines (denies migraines since college)  PSH: c/s x2 ('17; '20); Appendectomy ('06)  OBgynHx    -Complete SAB @ 8 wks.  2017-Primary c/s @ 38 wks for malpresentation. Female, 6.6# (PP SPEC/MgSO4)  2020-Repeat c/s @ 37+4 wks. siPEC. Female, 5.3# (MgSO4)  Pt denies any h/o: Abn. PAP, ovarian cysts, fibroids, breast problems, STDs/STIs  PSY: denies  EtOH/ smoke/ recreational substance use: denies  FH: Diabetes, hypertension (Father)

## 2020-02-18 NOTE — CHART NOTE - NSCHARTNOTEFT_GEN_A_CORE
Patient feeling much better since admission. headache improved and BP stable  Appreciate cardiology consult- to get CXR and echo and continue present medication

## 2020-02-18 NOTE — CONSULT NOTE ADULT - ASSESSMENT
33yo woman with PMHx essential hypertension s/p successful  POD #5 (course complicated by siPEC with severe features as well as KAYLA on CKD) who presents for uncontrolled hypertension and chest pains.    #Chest pain  DDx: Pulmonary embolism, pericarditis/myopericarditis, post-partum cardiomyopathy, SCAD, cholecystitis  -Chest pain described as pleuritic in character with chronicity of 4 days that gets better with sitting up  -EKG NSR with no ischemic changes; physical exam with mild-moderate JVD and mild LE edema  -Obtain CXR to assess for pulmonary vascular congestion  -F/u cardiac enzymes (including hsT and CKMB) as well as BNP  -Obtain complete TTE (patient reports having had an echo in 2017 with cardiologist in [place] who said it was a normal study)  -Continue telemetry monitoring    #Chronic Hypertension, now controlled  -Hx of hypertension with KAYLA on CKD; nephrology following as well  -CKD is complicated by nephrotic range proteinuria (could be cause for hypercoagulable state in addition to just being post partum)  -Higher pressures at home were likely from her chest pains and  incisional site pains  -Pain control per primary team  -Continue labetalol 300mg PO TID and Procardia XL 90mg PO daily    Cardiology will continue to follow.    Case discussed with Dr. Schaeffer.    Rosanne Hurtado MD PGY-4  Cardiology Fellow  All Cardiology service information can be found  on [email or web address], password: [email or web address]  Note is preliminary until signed by the attending.

## 2020-02-18 NOTE — H&P ADULT - HISTORY OF PRESENT ILLNESS
Patient of Dr Mccoy.  33 y/o [place] patient, now para 2012 POD 5 s/p scheduled repeat LTCS @ 37+4 wks for siPEC with severe features P/C ratio 3.0 on 2020, discharged home yesterday 20 @ noon. Patient also with increased BUN/Creatinine, follows by nephrology.   Presents to triage with c/o elevated blood pressures, headache, nausea, "gas pain" radiating to right chest and shoulder x 2 days and severe back pain and incisional pain since delivery. Pt with h/o CHTN on labetalol 200mg TID and Procardia, Pt reports blood pressures at home @ 2pm 160/85 and 180/95 @ 7pm. Last dose of Labetalol @ 7:30pm and Procardia @ 9:30pm. Pt states sharp frontal headache onset at 11pm, had left ocular pressure, but now right ocular pressure with photophobia and nausea without vomiting. Pt reports pain 5/10 on pain scale. Pt denies any visual changes, focal deficit, states SOB with gas pain and denies epigastric pain.     Allergies: NKDA  Meds: Labetalol 200mg TID; Procardia 90mg daily.  PMH: Chronic hypertension; migraines (denies migraines since college)  PSH: c/s x2 ('17; '20); Appendectomy ('06)  OBgynHx    -Complete SAB @ 8 wks.  2017-Primary c/s @ 38 wks for malpresentation. Female, 6.6# (PP SPEC/MgSO4)  2020-Repeat c/s @ 37+4 wks. siPEC. Female, 5.3# (MgSO4)  Pt denies any h/o: Abn. PAP, ovarian cysts, fibroids, breast problems, STDs/STIs  PSY: denies  EtOH/ smoke/ recreational substance use: denies  FH: Diabetes, hypertension (Father)

## 2020-02-18 NOTE — H&P ADULT - NSHPPHYSICALEXAM_GEN_ALL_CORE
Gen: NAD; A+O X3  Neuro exam: intact.   Cardiac: R/R/R, normal S1, S2  Pulm: CTAB  Abdomen: obese, soft, non-tender, non-distended, +bowel sounds. Incision: steri strips CDI.   DTRs: 3+, no clonus  Extremities: +1 pitting edema, strong pulses 2+ palpated BLE.  VS--BP: 167/90, repeated after 10 minutes 157/71, P 67, RR 18, T 36.4  BP trends: 167/90, 157/71, 154/70, 149/65, 151/76, 150/69, 156/73  Complete 12-point ROS was done and were all negative other than HPI.     Addendum  0445-Patient received Percocet one tab at 3:17 am and simethicone @ 3:39am, reports relief of back and incisional pain, but persistent right sided chest/shoulder pain 8/10 on pain scale. EKG obtained: Normal sinus rhythm with sinus arrythmia, possible left atrial enlargement. Cardiology called for consult and EKG read.   Labs resulted: BUN/creatinine trending down. A drop in H/H from 9.5/29.2 POD 1 to 7.5/23.7, no other comparisons; EBL @ delivery 800ml with QBL 430mL-Pt asymptomatic, no acute abdomen. Preliminary head CT: no emergent findings, f/u report in AM.   Dr Crane notified of all above findings    Plan:  Admit to floor  -increase Labetalol to 300 mg TID  -Maintain Procardia 90 mg QD  -Start Keppra 500mg BID  -Cardiology consult    Timeline  0115: Spoke to Neuro resident, Dr Son for consult, recommended CT head with CTV, will be in for bedside evaluation  0130-Spoke with radiology to protocol CT and spoke to CT tech to expedite scan.   0158-Dr Son (Neuro) at the bedside for evaluation.   0430-Cardiology called for consult and EKG read Gen: NAD; A+O X3  Neuro exam: intact.   Cardiac: R/R/R, normal S1, S2  Pulm: CTAB  Abdomen: obese, soft, non-tender, non-distended, +bowel sounds. Incision: steri strips CDI.   DTRs: 3+, no clonus  Extremities: +1 pitting edema, strong pulses 2+ palpated BLE.  VS--BP: 167/90, repeated after 10 minutes 157/71, P 67, RR 18, T 36.4  BP trends: 167/90, 157/71, 154/70, 149/65, 151/76, 150/69, 156/73  Complete 12-point ROS was done and were all negative other than HPI.     Addendum  0445-Patient received Percocet one tab at 3:17 am and simethicone @ 3:39am, reports relief of headache, back and incisional pain, but persistent right sided chest/shoulder pain 8/10 on pain scale. EKG obtained: Normal sinus rhythm with sinus arrythmia, possible left atrial enlargement. Cardiology called for consult and EKG read.   Labs resulted: BUN/creatinine trending down. A drop in H/H from 9.5/29.2 POD 1 to 7.5/23.7, no other comparisons; EBL @ delivery 800ml with QBL 430mL-Pt asymptomatic, no acute abdomen. Preliminary head CT: no emergent findings, f/u report in AM.   Dr Crane notified of all above findings    Plan:  Admit to floor  -increase Labetalol to 300 mg TID  -Maintain Procardia 90 mg QD  -Start Keppra 500mg BID  -AM CBC  -Cardiology consult    Timeline  0115: Spoke to Neuro resident, Dr Son for consult, recommended CT head with CTV, will be in for bedside evaluation  0130-Spoke with radiology to protocol CT and spoke to CT tech to expedite scan.   0158-Dr Son (Neuro) at the bedside for evaluation.   0430-Cardiology called for consult and EKG read

## 2020-02-18 NOTE — H&P ADULT - NSHPLABSRESULTS_GEN_ALL_CORE
7.5    10.62 )-----------( 264      ( 2020 01:18 )             23.7     02-18    134<L>  |  107  |  44<H>  ----------------------------<  90  5.6<H>   |  16<L>  |  2.39<H>    Ca    8.6      2020 01:18    TPro  5.8<L>  /  Alb  2.7<L>  /  TBili  < 0.2<L>  /  DBili  x   /  AST  29  /  ALT  28  /  AlkPhos  85  02-18    PT/INR - ( 2020 01:18 )   PT: 9.7 SEC;   INR: 0.88          PTT - ( 2020 01:18 )  PTT:25.6 SEC    FIBRINOGEN:    Urinalysis Basic - ( 2020 03:00 )    Color: COLORLESS / Appearance: CLEAR / S.006 / pH: 6.5  Gluc: NEGATIVE / Ketone: NEGATIVE  / Bili: NEGATIVE / Urobili: NORMAL   Blood: SMALL / Protein: 100 / Nitrite: NEGATIVE   Leuk Esterase: NEGATIVE / RBC: 3-5 / WBC 0-2   Sq Epi: OCC / Non Sq Epi: x / Bacteria: NEGATIVE

## 2020-02-19 ENCOUNTER — TRANSCRIPTION ENCOUNTER (OUTPATIENT)
Age: 35
End: 2020-02-19

## 2020-02-19 DIAGNOSIS — I10 ESSENTIAL (PRIMARY) HYPERTENSION: ICD-10-CM

## 2020-02-19 DIAGNOSIS — N17.9 ACUTE KIDNEY FAILURE, UNSPECIFIED: ICD-10-CM

## 2020-02-19 LAB
ALBUMIN SERPL ELPH-MCNC: 2.5 G/DL — LOW (ref 3.3–5)
ALP SERPL-CCNC: 88 U/L — SIGNIFICANT CHANGE UP (ref 40–120)
ALT FLD-CCNC: 39 U/L — HIGH (ref 4–33)
ANION GAP SERPL CALC-SCNC: 12 MMO/L — SIGNIFICANT CHANGE UP (ref 7–14)
APTT BLD: 29.3 SEC — SIGNIFICANT CHANGE UP (ref 27.5–36.3)
AST SERPL-CCNC: 32 U/L — SIGNIFICANT CHANGE UP (ref 4–32)
BASOPHILS # BLD AUTO: 0.03 K/UL — SIGNIFICANT CHANGE UP (ref 0–0.2)
BASOPHILS NFR BLD AUTO: 0.3 % — SIGNIFICANT CHANGE UP (ref 0–2)
BILIRUB SERPL-MCNC: < 0.2 MG/DL — LOW (ref 0.2–1.2)
BUN SERPL-MCNC: 47 MG/DL — HIGH (ref 7–23)
CALCIUM SERPL-MCNC: 8.7 MG/DL — SIGNIFICANT CHANGE UP (ref 8.4–10.5)
CHLORIDE SERPL-SCNC: 107 MMOL/L — SIGNIFICANT CHANGE UP (ref 98–107)
CK MB BLD-MCNC: 1.46 NG/ML — SIGNIFICANT CHANGE UP (ref 1–4.7)
CK SERPL-CCNC: 65 U/L — SIGNIFICANT CHANGE UP (ref 25–170)
CO2 SERPL-SCNC: 16 MMOL/L — LOW (ref 22–31)
CREAT SERPL-MCNC: 2.67 MG/DL — HIGH (ref 0.5–1.3)
EOSINOPHIL # BLD AUTO: 0.59 K/UL — HIGH (ref 0–0.5)
EOSINOPHIL NFR BLD AUTO: 6.2 % — HIGH (ref 0–6)
FIBRINOGEN PPP-MCNC: 915.4 MG/DL — HIGH (ref 350–510)
GLUCOSE SERPL-MCNC: 91 MG/DL — SIGNIFICANT CHANGE UP (ref 70–99)
HCT VFR BLD CALC: 23.3 % — LOW (ref 34.5–45)
HGB BLD-MCNC: 7.3 G/DL — LOW (ref 11.5–15.5)
IMM GRANULOCYTES NFR BLD AUTO: 3.1 % — HIGH (ref 0–1.5)
INR BLD: 0.9 — SIGNIFICANT CHANGE UP (ref 0.88–1.17)
LDH SERPL L TO P-CCNC: 217 U/L — SIGNIFICANT CHANGE UP (ref 135–225)
LYMPHOCYTES # BLD AUTO: 0.94 K/UL — LOW (ref 1–3.3)
LYMPHOCYTES # BLD AUTO: 9.9 % — LOW (ref 13–44)
MCHC RBC-ENTMCNC: 29.3 PG — SIGNIFICANT CHANGE UP (ref 27–34)
MCHC RBC-ENTMCNC: 31.3 % — LOW (ref 32–36)
MCV RBC AUTO: 93.6 FL — SIGNIFICANT CHANGE UP (ref 80–100)
MONOCYTES # BLD AUTO: 0.71 K/UL — SIGNIFICANT CHANGE UP (ref 0–0.9)
MONOCYTES NFR BLD AUTO: 7.5 % — SIGNIFICANT CHANGE UP (ref 2–14)
NEUTROPHILS # BLD AUTO: 6.93 K/UL — SIGNIFICANT CHANGE UP (ref 1.8–7.4)
NEUTROPHILS NFR BLD AUTO: 73 % — SIGNIFICANT CHANGE UP (ref 43–77)
NRBC # FLD: 0 K/UL — SIGNIFICANT CHANGE UP (ref 0–0)
NT-PROBNP SERPL-SCNC: 1542 PG/ML — SIGNIFICANT CHANGE UP
PLATELET # BLD AUTO: 267 K/UL — SIGNIFICANT CHANGE UP (ref 150–400)
PMV BLD: 10.2 FL — SIGNIFICANT CHANGE UP (ref 7–13)
POTASSIUM SERPL-MCNC: 5 MMOL/L — SIGNIFICANT CHANGE UP (ref 3.5–5.3)
POTASSIUM SERPL-SCNC: 5 MMOL/L — SIGNIFICANT CHANGE UP (ref 3.5–5.3)
PROT SERPL-MCNC: 5.5 G/DL — LOW (ref 6–8.3)
PROTHROM AB SERPL-ACNC: 10 SEC — SIGNIFICANT CHANGE UP (ref 9.8–13.1)
RBC # BLD: 2.49 M/UL — LOW (ref 3.8–5.2)
RBC # FLD: 14.1 % — SIGNIFICANT CHANGE UP (ref 10.3–14.5)
SODIUM SERPL-SCNC: 135 MMOL/L — SIGNIFICANT CHANGE UP (ref 135–145)
TROPONIN T, HIGH SENSITIVITY: 22 NG/L — SIGNIFICANT CHANGE UP (ref ?–14)
URATE SERPL-MCNC: 9.7 MG/DL — HIGH (ref 2.5–7)
WBC # BLD: 9.49 K/UL — SIGNIFICANT CHANGE UP (ref 3.8–10.5)
WBC # FLD AUTO: 9.49 K/UL — SIGNIFICANT CHANGE UP (ref 3.8–10.5)

## 2020-02-19 PROCEDURE — 99222 1ST HOSP IP/OBS MODERATE 55: CPT | Mod: GC

## 2020-02-19 RX ORDER — HYDRALAZINE HCL 50 MG
5 TABLET ORAL ONCE
Refills: 0 | Status: COMPLETED | OUTPATIENT
Start: 2020-02-19 | End: 2020-02-19

## 2020-02-19 RX ORDER — SODIUM CHLORIDE 0.65 %
1 AEROSOL, SPRAY (ML) NASAL THREE TIMES A DAY
Refills: 0 | Status: DISCONTINUED | OUTPATIENT
Start: 2020-02-19 | End: 2020-02-21

## 2020-02-19 RX ORDER — LABETALOL HCL 100 MG
20 TABLET ORAL ONCE
Refills: 0 | Status: COMPLETED | OUTPATIENT
Start: 2020-02-19 | End: 2020-02-19

## 2020-02-19 RX ORDER — HYDRALAZINE HCL 50 MG
10 TABLET ORAL ONCE
Refills: 0 | Status: COMPLETED | OUTPATIENT
Start: 2020-02-19 | End: 2020-02-19

## 2020-02-19 RX ORDER — LABETALOL HCL 100 MG
40 TABLET ORAL ONCE
Refills: 0 | Status: COMPLETED | OUTPATIENT
Start: 2020-02-19 | End: 2020-02-19

## 2020-02-19 RX ADMIN — Medication 300 MILLIGRAM(S): at 06:17

## 2020-02-19 RX ADMIN — Medication 975 MILLIGRAM(S): at 08:22

## 2020-02-19 RX ADMIN — Medication 300 MILLIGRAM(S): at 19:45

## 2020-02-19 RX ADMIN — SIMETHICONE 80 MILLIGRAM(S): 80 TABLET, CHEWABLE ORAL at 22:17

## 2020-02-19 RX ADMIN — Medication 5 MILLIGRAM(S): at 19:00

## 2020-02-19 RX ADMIN — Medication 975 MILLIGRAM(S): at 22:43

## 2020-02-19 RX ADMIN — Medication 975 MILLIGRAM(S): at 22:13

## 2020-02-19 RX ADMIN — LEVETIRACETAM 500 MILLIGRAM(S): 250 TABLET, FILM COATED ORAL at 06:17

## 2020-02-19 RX ADMIN — Medication 10 MILLIGRAM(S): at 19:28

## 2020-02-19 RX ADMIN — PANTOPRAZOLE SODIUM 40 MILLIGRAM(S): 20 TABLET, DELAYED RELEASE ORAL at 08:18

## 2020-02-19 RX ADMIN — Medication 975 MILLIGRAM(S): at 14:53

## 2020-02-19 RX ADMIN — Medication 975 MILLIGRAM(S): at 15:45

## 2020-02-19 RX ADMIN — HEPARIN SODIUM 5000 UNIT(S): 5000 INJECTION INTRAVENOUS; SUBCUTANEOUS at 22:10

## 2020-02-19 RX ADMIN — Medication 975 MILLIGRAM(S): at 09:10

## 2020-02-19 RX ADMIN — SIMETHICONE 80 MILLIGRAM(S): 80 TABLET, CHEWABLE ORAL at 18:25

## 2020-02-19 RX ADMIN — Medication 90 MILLIGRAM(S): at 22:10

## 2020-02-19 RX ADMIN — Medication 300 MILLIGRAM(S): at 14:50

## 2020-02-19 RX ADMIN — HEPARIN SODIUM 5000 UNIT(S): 5000 INJECTION INTRAVENOUS; SUBCUTANEOUS at 11:18

## 2020-02-19 RX ADMIN — Medication 40 MILLIGRAM(S): at 18:25

## 2020-02-19 RX ADMIN — Medication 20 MILLIGRAM(S): at 18:03

## 2020-02-19 NOTE — DISCHARGE NOTE PROVIDER - NSDCCPCAREPLAN_GEN_ALL_CORE_FT
PRINCIPAL DISCHARGE DIAGNOSIS  Diagnosis: Preeclampsia in postpartum period  Assessment and Plan of Treatment: - Continue BP meds as prescribed (hold is BP is under 110/60 or HR is under 60)  -Take blood pressure with at home cuff prior to taking medications; if BP is >150/90 call MD  - Return to hospital with headaches, visual changes, abdominal pain, nausea, vomiting, chest pain or shortness of breathe  - Follow up with OB in 2 days for BP check  - Follow up with Cardiology   - Follow up with Nephrology within one week

## 2020-02-19 NOTE — DISCHARGE NOTE PROVIDER - CARE PROVIDER_API CALL
John Mccoy (MD)  Obstetrics and Gynecology  [address] [facility] [address] [facility], [place] [address]  Phone: [phone]  Fax: [phone]  Follow Up Time:

## 2020-02-19 NOTE — DISCHARGE NOTE PROVIDER - NSDCFUSCHEDAPPT_GEN_ALL_CORE_FT
WILLIAN MAURICE ; 02/20/2020 ; [facility]  WILLIAN MAURICE ; 04/08/2020 ; NPP OB/GYN [id number] [facility] WILLIAN MAURICE ; 02/27/2020 ; NPP Med Nephro [address] WILLIAN Garcia ; 04/08/2020 ; NPP OB/GYN [id number] [facility]

## 2020-02-19 NOTE — PROGRESS NOTE ADULT - SUBJECTIVE AND OBJECTIVE BOX
R3 OB Note   HD#2, POD#6     Patient seen and examined at bedside, no acute overnight events. No acute complaints, pain well controlled. Reports mild headache overnight, now resolved. Remains on telemetry. Reports mild discomfort over right side of chest, overall improved.     Denies any HA, blurry vision, lightheadedness, CP/SOB, N/V. Patient is ambulating, voiding spontaneously, passing flatus, and tolerating regular diet.     Vital Signs Last 24 Hours  T(C): 36.9 (02-19-20 @ 05:29), Max: 36.9 (02-18-20 @ 21:32)  HR: 89 (02-19-20 @ 05:29) (68 - 99)  BP: 131/77 (02-19-20 @ 05:29) (108/70 - 144/83)  RR: 20 (02-19-20 @ 05:29) (18 - 26)  SpO2: 98% (02-19-20 @ 05:29) (93% - 99%)    Physical Exam:  General: NAD  CV: RRR  Pulm: CTAB  Abdomen: Soft, non-tender, non-distended  Incision: Pfannenstiel incision CDI, subcuticular suture closure  Pelvic: Lochia wnl; fundus firm and non-tender   Extremities: no calf tenderness noted on exam    Labs:    Blood Type: B Positive  Antibody Screen: Negative  RPR: Negative               7.3    9.49  )-----------( 267      ( 02-19 @ 05:20 )             23.3                7.2    10.38 )-----------( 266      ( 02-18 @ 16:15 )             23.4                7.4    10.40 )-----------( 276      ( 02-18 @ 09:13 )             23.2         MEDICATIONS  (STANDING):  heparin  Injectable 5000 Unit(s) SubCutaneous every 12 hours  labetalol 300 milliGRAM(s) Oral three times a day  levETIRAcetam 500 milliGRAM(s) Oral two times a day  NIFEdipine XL 90 milliGRAM(s) Oral daily  pantoprazole   Suspension 40 milliGRAM(s) Oral daily    MEDICATIONS  (PRN):  acetaminophen   Tablet .. 975 milliGRAM(s) Oral every 6 hours PRN Moderate Pain (4 - 6)  glycerin Suppository - Adult 1 Suppository(s) Rectal at bedtime PRN Constipation  lanolin Ointment 1 Application(s) Topical every 6 hours PRN Sore Nipples  magnesium hydroxide Suspension 30 milliLiter(s) Oral two times a day PRN Constipation  simethicone 80 milliGRAM(s) Chew every 4 hours PRN Gas

## 2020-02-19 NOTE — DISCHARGE NOTE PROVIDER - NSDCMRMEDTOKEN_GEN_ALL_CORE_FT
acetaminophen 325 mg oral tablet: 3 tab(s) orally , As Needed  labetalol 200 mg oral tablet: 1 tab(s) orally 3 times a day  NIFEdipine 90 mg oral tablet, extended release: 1 tab(s) orally once a day acetaminophen 325 mg oral tablet: 3 tab(s) orally , As Needed  labetalol 300 mg oral tablet: 1 tab(s) orally 3 times a day; check BP prior to taking  NIFEdipine 60 mg oral tablet, extended release: 1 tab(s) orally every 12 hours; check BP prior to taking medication

## 2020-02-19 NOTE — PROGRESS NOTE ADULT - PROBLEM SELECTOR PLAN 1
Neuro: Pain well controlled on current regimen. Continues on Keppra. Appreciate neurology consult. CT head with no abnormal findings.   CV: Hemodynamically stable, H/H stable. Continues on labetalol 300 TID and procardia 90XL. Appreciate cardiology consult. Cardiac enzymes neg. Echo performed last night, report pending. CXR with small pleural effusions noted.   Pulm: O2 sat WNL on RA, Increase ambulation, encourage incentive spirometry use  GI: Tolerating regular diet  : Voiding spontaneously  Heme: HSQ, SCDs while in bed  ID: Afebrile, No signs of infection  Dispo: Appreciate neurology and cardiology consult. Will follow up with echo and cardiology recommendations. Continues on keppra.     PAOLA Mera, PGY-3

## 2020-02-19 NOTE — PROGRESS NOTE ADULT - ASSESSMENT
33yo woman with PMHx essential hypertension s/p successful  POD #5 (course complicated by siPEC with severe features as well as KAYLA on CKD) who presents for uncontrolled hypertension and chest pains.    #Chest pain  DDx: More likely pericarditis, viral etiology vs rheumatologic   -Chest pain described as pleuritic in character that gets better with sitting up  -Cardiac enzymes are negative and BNP is mildly elevated but also in the context of renal disease  -CXR with mild pleural effusions and TTE with normal LV function and no pericardial effusion  -Safety profile of using colchicine in the lactating mother is unclear since it has anti-mitotic properties that could affect her ; would defer to gynecology regarding its use  -Would err on side of caution and use Tylenol instead for pain management since she is experiencing good relief with this anyway  -Okay to monitor off telemetry if needed    #Chronic Hypertension, now controlled  -Hx of hypertension with KAYLA on CKD; nephrology following as well  -CKD is complicated by nephrotic range proteinuria (could be cause for hypercoagulable state in addition to just being post partum)  -Higher pressures at home were likely from her chest pains and  incisional site pains  -Pain control per primary team  -Continue labetalol PO TID (consider downtitrating dose back down to 200mg PO TID) and Procardia XL 90mg PO daily    Cardiology will continue to follow.    Case discussed with Dr. Schaeffer.    Rosanne Hurtado MD PGY-4  Cardiology Fellow, [id number]  *Note is preliminary until signed by the attending 33yo woman with PMHx essential hypertension s/p successful  POD #5 (course complicated by siPEC with severe features as well as KAYLA on CKD) who presents for uncontrolled hypertension and chest pains.    #Chest pain  DDx: More likely pericarditis, viral etiology vs rheumatologic   -Chest pain described as pleuritic in character that gets better with sitting up  -Cardiac enzymes are negative and BNP is mildly elevated but also in the context of renal disease  -CXR with mild pleural effusions and TTE with normal LV function and no pericardial effusion  -Safety profile of using colchicine in the lactating mother is unclear since it has anti-mitotic properties that could affect her ; would defer to gynecology regarding its use  -Would err on side of caution and use Tylenol instead for pain management since she is experiencing good relief with this anyway  -Okay to monitor off telemetry if needed    #Chronic Hypertension, now controlled  -Hx of hypertension with KAYLA on CKD; nephrology following as well  -CKD is complicated by nephrotic range proteinuria (could be cause for hypercoagulable state in addition to just being post partum)  -Higher pressures at home were likely from her chest pains and  incisional site pains  -Pain control per primary team  -Continue labetalol PO TID (consider downtitrating dose back down to 200mg PO TID) and Procardia XL 90mg PO daily    Please call cardiology back with any further questions.    Case discussed with Dr. Schaeffer.    Rosanne Hurtado MD PGY-4  Cardiology Fellow, [id number]  *Note is preliminary until signed by the attending

## 2020-02-19 NOTE — PROGRESS NOTE ADULT - ASSESSMENT
35yo  POD#6 s/p C/S readmitted with postpartum siPEC with known KAYLA and abdominal/chest discomfort, in stable condition.

## 2020-02-19 NOTE — CONSULT NOTE ADULT - PROBLEM SELECTOR RECOMMENDATION 2
Hypertensive at home, currently stable 120-130s/70-80s  - continue current BP regimen  - Monitor BP.  Low salt diet advised

## 2020-02-19 NOTE — CONSULT NOTE ADULT - ASSESSMENT
34F PMHx CKD (unknown etiology, no biopsy/workup), chronic HTN, proteinuria, preeclampsia s/p scheduled  (2020) present to ED for elevated BP (180s/90s), chest pain and headache admitted for postpartum preeclampsia.     Nephrology consulted for Acute on CKD - on admission sCr 2.39 (baseline SCr 1.5-1.8, d/c sCr 2.2 on ), peaked 2.67.  Acute on CKD possibly hemodynamically meditated in setting uncontrolled BP and/or postpartum preeclampsia

## 2020-02-19 NOTE — CONSULT NOTE ADULT - PROBLEM SELECTOR RECOMMENDATION 9
KAYLA on CKD (unclear etiology) likely hemodynamically meditated in setting elevated BP ?post partum preeclampsia ( 2020).   CKD unknown etiology   On admission sCr 2.39 (baseline SCr 1.5-1.8, d/c sCr 2.2 on ), peaked 2.67.    Recommendations:  - please monitor preeclampsia labs (sCr, serum uric acid, platelet, protein/Cr ratio spot, PT/PTT, fibrinogen), check urine protein/cr spot ratio, VEGF level  - BP control (goal <140/80)  - avoid nephrotoxic agents (ACEi/ARB, NSAIDS), renally dose medications per GFR  - trend sCr, strict I/O, daily weight, renal/low potassium diet

## 2020-02-19 NOTE — DISCHARGE NOTE PROVIDER - NSDCCPGOAL_GEN_ALL_CORE_FT
----- Message from Priya Radford sent at 6/6/2017  9:26 AM EDT -----  Patients wife refused diabetes education appt, is this okay to cancel?   To get better and follow your care plan as instructed.

## 2020-02-19 NOTE — CONSULT NOTE ADULT - ATTENDING COMMENTS
The patient was presented on rounds, the chart was reviewed and she was examined at the bedside.  She has been experiencing low-grade migratory circumscribed headaches that are mostly localized to the frontal area and not associated with nausea or vomiting.  She reports that her headaches typically occur when her blood pressure is elevated and these headaches are similar in quality and location.  She reports that her headaches have resolved and she is presently pain-free.  The neurological examination is nonfocal, CT scan of the head is negative and my index of suspicion for an intracranial process is low.  Would hold off on any further neurodiagnostic testing at this time and follow her clinically.
The patient was seen and examined with the cardiology consultation team.    She is a 34-year-old woman with hypertension and chronic kidney disease who recently underwent , now presenting with severe hypertension, headache and chest pain. We are consulted for the evaluation of chest pain. As noted above, the patient's chest pain is pleuritic and positional in nature. ECG is unremarkable as are hs-troponin.     I agree with the assessment and recommendations noted above. The patient's chest pain is most likely non-cardiac in nature. The patient's description, however, is fairly typical of pericarditis. The ECG does not demonstrate the typical pattern of diffuse ST-elevation and MI depression, and there is no friction rub. Echocardiography can be obtained to assess for an effusion, although pericarditis can occur in the absence of an effusion. We will hold off regarding empiric treatment given the patient's renal function and plans to breast feed. We will need to investigate the safety profile of colchicine in pregnancy/breast feeding.    The other etiologies of chest pain considered, such as spontaneous coronary artery dissection (SCAD) and more typical coronary atherosclerotic disease are less likely given the normal ECG and cardiac biomarkers. When considering the possibility of PE, the patient's Well's Score is 0 (low risk).    Juancarlos Schaeffer MD  Cardiology
discussed Lasix for edema however may decrease milk volume and she is still nursing

## 2020-02-19 NOTE — CONSULT NOTE ADULT - SUBJECTIVE AND OBJECTIVE BOX
[facility] DIVISION OF KIDNEY DISEASES AND HYPERTENSION -- INITIAL CONSULT NOTE   OZIEL Fellow pager # [phone]  OZIEL Attending phone # [phone]  Nephrology office # [phone]  --------------------------------------------------------------------------------  HPI:  34F PMHx CKD (unknown etiology, no bx), chronic HTN, proteinuria, preeclampsia s/p scheduled  (2020) present to ED for elevated BP chest pain.  Patient was recently discharge from [facility] on 2020 after which went home and next day had elevated BPs (180s/90s) which didn't improved with BP meds (labetalol and procardia).  Afterward felt headache (frontal and L ocular pressure) and right sided chest and back pain thereby return to ED.     Nephrology consulted for Acute on CKD - on admission sCr 2.39 (baseline SCr 1.5-1.8, d/c sCr 2.2 on ), peaked 2.67.  Urinalysis showed protein 100 w/ specific gravity 1.007 (diluted urine), CT Head negative, CXr trace L pleural effusion and Echo showed LV wall thickness otherwise normal function LV/RV walls, no segmental abnormalities.   cc first 24hrs.  No epsiodes hypotension, no contrast, no nephrotoxic agents given.  Acute on CKD possibly hemodynamically meditated in setting uncontrolled BP.    PAST HISTORY  --------------------------------------------------------------------------------  PAST MEDICAL & SURGICAL HISTORY:  Pregnancy  HTN (hypertension)  History of  section  Status post appendectomy    FAMILY HISTORY:  Asthma (Mother)  Family history of heart attack (Father)  Hypertension (Father)  Diabetes mellitus (Father)    PAST SOCIAL HISTORY:   with 2 children both delivered via , non-smoker    ALLERGIES & MEDICATIONS  --------------------------------------------------------------------------------  Allergies    No Known Allergies    Intolerances      Standing Inpatient Medications  heparin  Injectable 5000 Unit(s) SubCutaneous every 12 hours  labetalol 300 milliGRAM(s) Oral three times a day  NIFEdipine XL 90 milliGRAM(s) Oral daily  pantoprazole   Suspension 40 milliGRAM(s) Oral daily    PRN Inpatient Medications  acetaminophen   Tablet .. 975 milliGRAM(s) Oral every 6 hours PRN  glycerin Suppository - Adult 1 Suppository(s) Rectal at bedtime PRN  lanolin Ointment 1 Application(s) Topical every 6 hours PRN  magnesium hydroxide Suspension 30 milliLiter(s) Oral two times a day PRN  simethicone 80 milliGRAM(s) Chew every 4 hours PRN  sodium chloride 0.65% Nasal 1 Spray(s) Both Nostrils three times a day PRN      REVIEW OF SYSTEMS  --------------------------------------------------------------------------------  Gen: headache (resolved). No fatigue, fevers/chills, weakness  Skin: No rashes  Respiratory: No dyspnea, cough  CV: No chest pain (resolved)  GI: No abdominal pain, diarrhea, constipation, nausea, vomiting  : No increased frequency, dysuria, hematuria  MSK: lower ext edema (improving)  Neuro: headache (resolved).  Heme: No easy bruising or bleeding      All other systems were reviewed and are negative, except as noted.    VITALS/PHYSICAL EXAM  --------------------------------------------------------------------------------  T(C): 36.6 (20 @ 17:36), Max: 36.9 (20 @ 21:32)  HR: 66 (20 @ 17:36) (66 - 92)  BP: 170/88 (20 @ 17:36) (119/62 - 170/88)  RR: 21 (20 @ 17:36) (18 - 21)  SpO2: 100% (20 @ 17:36) (98% - 100%)  Wt(kg): --        20 @ 07:01  -  20 @ 07:00  --------------------------------------------------------  IN: 0 mL / OUT: 800 mL / NET: -800 mL    20 @ 07:01  -  20 @ 18:09  --------------------------------------------------------  IN: 0 mL / OUT: 300 mL / NET: -300 mL      Physical Exam:  	Gen: NAD, well-appearing on room air  	HEENT: moist mucous membrane, no JVD, sclera clear  	Pulm: CTA B/L, no crackles  	CV: RRR, S1S2; no rub/murmur  	Abd: +BS, soft  	LE: Warm, bilateral lower extremity edema +2 (improving)  	Psych: Normal affect and mood  	Skin: Warm, no cyanosis      LABS/STUDIES  --------------------------------------------------------------------------------              7.3    9.49  >-----------<  267      [20 05:20]              23.3     135  |  107  |  47  ----------------------------<  91      [20 05:20]  5.0   |  16  |  2.67        Ca     8.7     [20 05:20]    TPro  5.5  /  Alb  2.5  /  TBili  < 0.2  /  DBili  x   /  AST  32  /  ALT  39  /  AlkPhos  88  [20 05:20]    PT/INR: PT 10.0 , INR 0.90       [20 05:20]  PTT: 29.3       [20 05:20]    Uric acid 9.7      [20 05:20]  CK 65      [20 05:20]        [20 05:20]    Creatinine Trend:  SCr 2.67 [:20]  SCr 2.54 [:15]  SCr 2.43 [ 09:13]  SCr 2.39 [ 01:18]  SCr 2.26 [ 06:00]    Urinalysis - [20 @ 03:00]      Color COLORLESS / Appearance CLEAR / SG 1.006 / pH 6.5      Gluc NEGATIVE / Ketone NEGATIVE  / Bili NEGATIVE / Urobili NORMAL       Blood SMALL / Protein 100 / Leuk Est NEGATIVE / Nitrite NEGATIVE      RBC 3-5 / WBC 0-2 / Hyaline NEGATIVE / Gran  / Sq Epi OCC / Non Sq Epi  / Bacteria NEGATIVE    Urine Creatinine 42.70      [20 @ 12:00]  Urine Protein 198.9      [20 @ 12:00]  Urine Sodium 28      [20 @ 20:30]  Urine Potassium 10.4      [20 20:30]  Urine Chloride 24      [20 20:30]  Urine Osmolality 154      [20 @ 20:30]    Lipid: chol 250, , HDL 53, LDL --      [20 @ 07:54]    HBsAg NEGATIVE      [20 @ 07:01]    ASHLEY: titer Negative, pattern --      [20 @ 07:01]  dsDNA <12      [20 @ 07:01]  C3 Complement 144.0      [20 @ 07:01]  C4 Complement 24.4      [20 @ 07:01]  Syphilis Screen (Treponema Pallidum Ab) Negative      [20 @ 10:00] [facility] DIVISION OF KIDNEY DISEASES AND HYPERTENSION -- INITIAL CONSULT NOTE   OZIEL Fellow pager # [phone]  OZIEL Attending phone # [phone]  Nephrology office # [phone]  --------------------------------------------------------------------------------  HPI:  34F PMHx CKD (unknown etiology, no bx), chronic HTN, proteinuria, preeclampsia s/p scheduled  (2020) present to ED for elevated BP chest pain.  Patient was recently discharge from [facility] on 2020 after which went home and next day had elevated BPs (180s/90s) which didn't improved with BP meds (labetalol and procardia).  Afterward felt headache (frontal and L ocular pressure) and right sided chest and back pain thereby return to ED.     Nephrology consulted for Acute on CKD - on admission sCr 2.39 (baseline SCr 1.5-1.8, d/c sCr 2.2 on ), peaked 2.67.  Urinalysis showed protein 100 w/ specific gravity 1.007 (diluted urine), CT Head negative, CXr trace L pleural effusion and Echo showed LV wall thickness otherwise normal function LV/RV walls, no segmental abnormalities.   cc first 24hrs.  No epsiodes hypotension, no contrast, no nephrotoxic agents given.  Acute on CKD possibly hemodynamically meditated in setting uncontrolled BP.    PAST HISTORY  --------------------------------------------------------------------------------  PAST MEDICAL & SURGICAL HISTORY:  Pregnancy  HTN (hypertension)  History of  section  Status post appendectomy    FAMILY HISTORY:  Asthma (Mother)  Family history of heart attack (Father)  Hypertension (Father)  Diabetes mellitus (Father)    PAST SOCIAL HISTORY:   with 2 children both delivered via , non-smoker    ALLERGIES & MEDICATIONS  --------------------------------------------------------------------------------  Allergies    No Known Allergies    Intolerances      Standing Inpatient Medications  heparin  Injectable 5000 Unit(s) SubCutaneous every 12 hours  labetalol 300 milliGRAM(s) Oral three times a day  NIFEdipine XL 90 milliGRAM(s) Oral daily  pantoprazole   Suspension 40 milliGRAM(s) Oral daily    PRN Inpatient Medications  acetaminophen   Tablet .. 975 milliGRAM(s) Oral every 6 hours PRN  glycerin Suppository - Adult 1 Suppository(s) Rectal at bedtime PRN  lanolin Ointment 1 Application(s) Topical every 6 hours PRN  magnesium hydroxide Suspension 30 milliLiter(s) Oral two times a day PRN  simethicone 80 milliGRAM(s) Chew every 4 hours PRN  sodium chloride 0.65% Nasal 1 Spray(s) Both Nostrils three times a day PRN      REVIEW OF SYSTEMS  --------------------------------------------------------------------------------  Gen: headache (resolved). No fatigue, fevers/chills, weakness  Skin: No rashes  Respiratory: No dyspnea, cough  CV: No chest pain (resolved)  GI: No abdominal pain, diarrhea, constipation, nausea, vomiting  : No increased frequency, dysuria, hematuria  MSK: lower ext edema (improving)  Neuro: headache (resolved).  Heme: No easy bruising or bleeding      All other systems were reviewed and are negative, except as noted.    VITALS/PHYSICAL EXAM  --------------------------------------------------------------------------------  T(C): 36.6 (20 @ 17:36), Max: 36.9 (20 @ 21:32)  HR: 66 (20 @ 17:36) (66 - 92)  BP: 170/88 (20 @ 17:36) (119/62 - 170/88)  RR: 21 (20 @ 17:36) (18 - 21)  SpO2: 100% (20 @ 17:36) (98% - 100%)  Wt(kg): --        20 @ 07:01  -  20 @ 07:00  --------------------------------------------------------  IN: 0 mL / OUT: 800 mL / NET: -800 mL    20 @ 07:01  -  20 @ 18:09  --------------------------------------------------------  IN: 0 mL / OUT: 300 mL / NET: -300 mL      Physical Exam:  	Gen: NAD, well-appearing on room air  	HEENT: moist mucous membrane, no JVD, sclera clear  	Pulm: CTA B/L, no crackles  	CV: RRR, S1S2; no rub/murmur  	GI: Abd: +BS, soft  	LE: Warm, bilateral lower extremity edema +2 (improving)  	Psych: Normal affect and mood  	Skin: Warm, no cyanosis              Neuro: non-focal              MSK: no Joint pain       LABS/STUDIES  --------------------------------------------------------------------------------              7.3    9.49  >-----------<  267      [20 @ 05:20]              23.3     135  |  107  |  47  ----------------------------<  91      [20 05:20]  5.0   |  16  |  2.67        Ca     8.7     [20 05:20]    TPro  5.5  /  Alb  2.5  /  TBili  < 0.2  /  DBili  x   /  AST  32  /  ALT  39  /  AlkPhos  88  [20 05:20]    PT/INR: PT 10.0 , INR 0.90       [20 05:20]  PTT: 29.3       [20 05:20]    Uric acid 9.7      [20 05:20]  CK 65      [20 05:20]        [20 05:20]    Creatinine Trend:  SCr 2.67 [:20]  SCr 2.54 [ 16:15]  SCr 2.43 [ 09:13]  SCr 2.39 [ 01:18]  SCr 2.26 [ 06:00]    Urinalysis - [20 @ 03:00]      Color COLORLESS / Appearance CLEAR / SG 1.006 / pH 6.5      Gluc NEGATIVE / Ketone NEGATIVE  / Bili NEGATIVE / Urobili NORMAL       Blood SMALL / Protein 100 / Leuk Est NEGATIVE / Nitrite NEGATIVE      RBC 3-5 / WBC 0-2 / Hyaline NEGATIVE / Gran  / Sq Epi OCC / Non Sq Epi  / Bacteria NEGATIVE    Urine Creatinine 42.70      [20 @ 12:00]  Urine Protein 198.9      [20 @ 12:00]  Urine Sodium 28      [20 @ 20:30]  Urine Potassium 10.4      [20 @ 20:30]  Urine Chloride 24      [20 @ 20:30]  Urine Osmolality 154      [20 @ 20:30]    Lipid: chol 250, , HDL 53, LDL --      [20 @ 07:54]    HBsAg NEGATIVE      [20 @ 07:01]    ASHLEY: titer Negative, pattern --      [20 @ 07:01]  dsDNA <12      [20 @ 07:01]  C3 Complement 144.0      [20 @ 07:01]  C4 Complement 24.4      [20 @ 07:01]  Syphilis Screen (Treponema Pallidum Ab) Negative      [20 @ 10:00]

## 2020-02-19 NOTE — PROGRESS NOTE ADULT - SUBJECTIVE AND OBJECTIVE BOX
Daily In-House Cardiology Progress Note  -------------------------------------------------------    Patient seen and examined at bedside in [address] [place] [address].    Subjective:      -Right sided chest pain improved to 3/10 intensity. Tylenol helps to alleviate the pain.    Telemetry:  -NSR    Current Meds:  acetaminophen   Tablet .. 975 milliGRAM(s) Oral every 6 hours PRN  glycerin Suppository - Adult 1 Suppository(s) Rectal at bedtime PRN  heparin  Injectable 5000 Unit(s) SubCutaneous every 12 hours  labetalol 300 milliGRAM(s) Oral three times a day  lanolin Ointment 1 Application(s) Topical every 6 hours PRN  levETIRAcetam 500 milliGRAM(s) Oral two times a day  magnesium hydroxide Suspension 30 milliLiter(s) Oral two times a day PRN  NIFEdipine XL 90 milliGRAM(s) Oral daily  pantoprazole   Suspension 40 milliGRAM(s) Oral daily  simethicone 80 milliGRAM(s) Chew every 4 hours PRN    Vitals:  T(F): 98.5 (02-19), Max: 98.5 (02-19)  HR: 89 (02-19) (83 - 99)  BP: 131/77 (02-19) (108/70 - 134/71)  RR: 20 (02-19)  SpO2: 98% (02-19)  I&O's Summary    18 Feb 2020 07:01  -  19 Feb 2020 07:00  --------------------------------------------------------  IN: 0 mL / OUT: 800 mL / NET: -800 mL    Physical Exam:  Appearance: No acute distress; well appearing  Eyes: PERRL, EOMI, pink conjunctiva  HEENT: Normal oral mucosa  Cardiovascular: RRR, S1, S2, no murmurs, rubs, or gallops; no edema; mild JVD  Respiratory: Clear to auscultation bilaterally  Gastrointestinal: soft, non-tender, non-distended with normal bowel sounds  Musculoskeletal: No clubbing; no joint deformity  Neurologic: Non-focal  Lymphatic: No lymphadenopathy  Psychiatry: AAOx3, mood & affect appropriate  Skin: No rashes, ecchymoses, or cyanosis  Extremities: LE +1 pitting edema b/l up to midshins                        7.3    9.49  )-----------( 267      ( 19 Feb 2020 05:20 )             23.3     02-19    135  |  107  |  47<H>  ----------------------------<  91  5.0   |  16<L>  |  2.67<H>    Ca    8.7      19 Feb 2020 05:20    TPro  5.5<L>  /  Alb  2.5<L>  /  TBili  < 0.2<L>  /  DBili  x   /  AST  32  /  ALT  39<H>  /  AlkPhos  88  02-19    PT/INR - ( 19 Feb 2020 05:20 )   PT: 10.0 SEC;   INR: 0.90          PTT - ( 19 Feb 2020 05:20 )  PTT:29.3 SEC  CARDIAC MARKERS ( 19 Feb 2020 05:20 )  x     / x     / x     / 65 u/L / 1.46 ng/mL / x      CARDIAC MARKERS ( 18 Feb 2020 16:15 )  x     / x     / x     / 84 u/L / x     / x      CARDIAC MARKERS ( 18 Feb 2020 09:13 )  x     / x     / x     / 94 u/L / 1.94 ng/mL / x          Serum Pro-Brain Natriuretic Peptide: 1542 pg/mL (02-19 @ 05:20)

## 2020-02-19 NOTE — DISCHARGE NOTE PROVIDER - HOSPITAL COURSE
33yo  POD#6 s/p C/S readmitted with postpartum siPEC with known KAYLA and abdominal/chest discomfort. Pt started on Keppra for seizure ppx on admission, and labetalol increased to 300 mg TID. Neuro and cardiology consulted. BP well controlled on on labetalol 300 TID and procardia 90XL. Appreciate cardiology consult. Cardiac enzymes neg. Echo performed: showing borderline pulmonary hypertension. CXR with small pleural effusions noted. Appreciate neurology consult; CT head WNL; unable to obtain CT venogram due to elevated creatinine (unable to receive IV contrast). Pt denies headache on HD2, denies visual changes, nausea/vomiting. Abdominal/ chest discomfort resolving. Nephrology consulted for elevated creatinine HD 2____ 35yo  POD#6 s/p C/S readmitted with postpartum siPEC with known KAYLA and abdominal/chest discomfort. Pt started on Keppra for seizure ppx on admission, which was d/c after 24 hours, and labetalol increased to 300 mg TID. Neuro and cardiology consulted. BP well controlled on on labetalol 300 TID and procardia 90XL. Appreciate cardiology consult. Cardiac enzymes neg. Echo performed: showing borderline pulmonary hypertension. CXR with small pleural effusions noted. Appreciate neurology consult; CT head WNL; unable to obtain CT venogram due to elevated creatinine (unable to receive IV contrast). Pt denies headache on HD2, denies visual changes, nausea/vomiting. Abdominal/ chest discomfort resolving. Nephrology consulted for elevated creatinine. She had elevated BPs requiring IV medication on HD2-3. On HOD3 she was increased to procardia 60XL BID. She has been asymptomatic, denies any HA, blurry vision, epigastric pain. BPs have been well controlled. She will follow up with Dr. Torres next week for possible renal biopsy. She will be discharged with follow up for BP check this week followed by outpatient renal followup. She will continue to check BPs prior to taking medications.

## 2020-02-19 NOTE — PROGRESS NOTE ADULT - SUBJECTIVE AND OBJECTIVE BOX
Attg.   pt seen and evaluated by me. resting in bed comfortably , now on procardia 90 and Labetalol 300 TID.  pt offers no complaints, POD #6. will await MFM rounds today with recommendations for discharge.   COURTNEY Mccoy M.D.

## 2020-02-20 LAB
ALBUMIN SERPL ELPH-MCNC: 2.4 G/DL — LOW (ref 3.3–5)
ALP SERPL-CCNC: 92 U/L — SIGNIFICANT CHANGE UP (ref 40–120)
ALT FLD-CCNC: 34 U/L — HIGH (ref 4–33)
ANION GAP SERPL CALC-SCNC: 12 MMO/L — SIGNIFICANT CHANGE UP (ref 7–14)
APTT BLD: 28.5 SEC — SIGNIFICANT CHANGE UP (ref 27.5–36.3)
AST SERPL-CCNC: 17 U/L — SIGNIFICANT CHANGE UP (ref 4–32)
BASOPHILS # BLD AUTO: 0.04 K/UL — SIGNIFICANT CHANGE UP (ref 0–0.2)
BASOPHILS NFR BLD AUTO: 0.5 % — SIGNIFICANT CHANGE UP (ref 0–2)
BILIRUB SERPL-MCNC: < 0.2 MG/DL — LOW (ref 0.2–1.2)
BUN SERPL-MCNC: 47 MG/DL — HIGH (ref 7–23)
CALCIUM SERPL-MCNC: 8.3 MG/DL — LOW (ref 8.4–10.5)
CHLORIDE SERPL-SCNC: 109 MMOL/L — HIGH (ref 98–107)
CK MB BLD-MCNC: 1.4 NG/ML — SIGNIFICANT CHANGE UP (ref 1–4.7)
CK SERPL-CCNC: 60 U/L — SIGNIFICANT CHANGE UP (ref 25–170)
CO2 SERPL-SCNC: 15 MMOL/L — LOW (ref 22–31)
CREAT SERPL-MCNC: 2.71 MG/DL — HIGH (ref 0.5–1.3)
EOSINOPHIL # BLD AUTO: 0.57 K/UL — HIGH (ref 0–0.5)
EOSINOPHIL NFR BLD AUTO: 6.6 % — HIGH (ref 0–6)
FIBRINOGEN PPP-MCNC: 761 MG/DL — HIGH (ref 350–510)
GLUCOSE SERPL-MCNC: 90 MG/DL — SIGNIFICANT CHANGE UP (ref 70–99)
HCT VFR BLD CALC: 23.8 % — LOW (ref 34.5–45)
HGB BLD-MCNC: 7.3 G/DL — LOW (ref 11.5–15.5)
IMM GRANULOCYTES NFR BLD AUTO: 4.3 % — HIGH (ref 0–1.5)
INR BLD: 0.88 — SIGNIFICANT CHANGE UP (ref 0.88–1.17)
LDH SERPL L TO P-CCNC: 213 U/L — SIGNIFICANT CHANGE UP (ref 135–225)
LYMPHOCYTES # BLD AUTO: 1.1 K/UL — SIGNIFICANT CHANGE UP (ref 1–3.3)
LYMPHOCYTES # BLD AUTO: 12.8 % — LOW (ref 13–44)
MCHC RBC-ENTMCNC: 29 PG — SIGNIFICANT CHANGE UP (ref 27–34)
MCHC RBC-ENTMCNC: 30.7 % — LOW (ref 32–36)
MCV RBC AUTO: 94.4 FL — SIGNIFICANT CHANGE UP (ref 80–100)
MONOCYTES # BLD AUTO: 0.65 K/UL — SIGNIFICANT CHANGE UP (ref 0–0.9)
MONOCYTES NFR BLD AUTO: 7.6 % — SIGNIFICANT CHANGE UP (ref 2–14)
NEUTROPHILS # BLD AUTO: 5.87 K/UL — SIGNIFICANT CHANGE UP (ref 1.8–7.4)
NEUTROPHILS NFR BLD AUTO: 68.2 % — SIGNIFICANT CHANGE UP (ref 43–77)
NRBC # FLD: 0 K/UL — SIGNIFICANT CHANGE UP (ref 0–0)
NT-PROBNP SERPL-SCNC: 1801 PG/ML — SIGNIFICANT CHANGE UP
PLATELET # BLD AUTO: 306 K/UL — SIGNIFICANT CHANGE UP (ref 150–400)
PMV BLD: 10.3 FL — SIGNIFICANT CHANGE UP (ref 7–13)
POTASSIUM SERPL-MCNC: 4.6 MMOL/L — SIGNIFICANT CHANGE UP (ref 3.5–5.3)
POTASSIUM SERPL-SCNC: 4.6 MMOL/L — SIGNIFICANT CHANGE UP (ref 3.5–5.3)
PROT SERPL-MCNC: 5.6 G/DL — LOW (ref 6–8.3)
PROTHROM AB SERPL-ACNC: 10 SEC — SIGNIFICANT CHANGE UP (ref 9.8–13.1)
RBC # BLD: 2.52 M/UL — LOW (ref 3.8–5.2)
RBC # FLD: 14.3 % — SIGNIFICANT CHANGE UP (ref 10.3–14.5)
SODIUM SERPL-SCNC: 136 MMOL/L — SIGNIFICANT CHANGE UP (ref 135–145)
TROPONIN T, HIGH SENSITIVITY: 19 NG/L — SIGNIFICANT CHANGE UP (ref ?–14)
URATE SERPL-MCNC: 9.9 MG/DL — HIGH (ref 2.5–7)
WBC # BLD: 8.6 K/UL — SIGNIFICANT CHANGE UP (ref 3.8–10.5)
WBC # FLD AUTO: 8.6 K/UL — SIGNIFICANT CHANGE UP (ref 3.8–10.5)

## 2020-02-20 PROCEDURE — 99233 SBSQ HOSP IP/OBS HIGH 50: CPT | Mod: GC

## 2020-02-20 RX ORDER — FUROSEMIDE 40 MG
20 TABLET ORAL ONCE
Refills: 0 | Status: COMPLETED | OUTPATIENT
Start: 2020-02-20 | End: 2020-02-20

## 2020-02-20 RX ORDER — NIFEDIPINE 30 MG
60 TABLET, EXTENDED RELEASE 24 HR ORAL EVERY 12 HOURS
Refills: 0 | Status: DISCONTINUED | OUTPATIENT
Start: 2020-02-20 | End: 2020-02-21

## 2020-02-20 RX ADMIN — SIMETHICONE 80 MILLIGRAM(S): 80 TABLET, CHEWABLE ORAL at 13:40

## 2020-02-20 RX ADMIN — Medication 975 MILLIGRAM(S): at 11:04

## 2020-02-20 RX ADMIN — Medication 975 MILLIGRAM(S): at 23:16

## 2020-02-20 RX ADMIN — Medication 975 MILLIGRAM(S): at 18:20

## 2020-02-20 RX ADMIN — Medication 300 MILLIGRAM(S): at 13:37

## 2020-02-20 RX ADMIN — Medication 20 MILLIGRAM(S): at 12:08

## 2020-02-20 RX ADMIN — Medication 975 MILLIGRAM(S): at 09:32

## 2020-02-20 RX ADMIN — SIMETHICONE 80 MILLIGRAM(S): 80 TABLET, CHEWABLE ORAL at 07:01

## 2020-02-20 RX ADMIN — HEPARIN SODIUM 5000 UNIT(S): 5000 INJECTION INTRAVENOUS; SUBCUTANEOUS at 22:29

## 2020-02-20 RX ADMIN — Medication 975 MILLIGRAM(S): at 16:51

## 2020-02-20 RX ADMIN — SIMETHICONE 80 MILLIGRAM(S): 80 TABLET, CHEWABLE ORAL at 20:59

## 2020-02-20 RX ADMIN — Medication 300 MILLIGRAM(S): at 22:29

## 2020-02-20 RX ADMIN — HEPARIN SODIUM 5000 UNIT(S): 5000 INJECTION INTRAVENOUS; SUBCUTANEOUS at 09:32

## 2020-02-20 RX ADMIN — PANTOPRAZOLE SODIUM 40 MILLIGRAM(S): 20 TABLET, DELAYED RELEASE ORAL at 11:01

## 2020-02-20 RX ADMIN — Medication 60 MILLIGRAM(S): at 18:19

## 2020-02-20 RX ADMIN — Medication 300 MILLIGRAM(S): at 06:09

## 2020-02-20 NOTE — PROGRESS NOTE ADULT - SUBJECTIVE AND OBJECTIVE BOX
[facility] DIVISION OF KIDNEY DISEASES AND HYPERTENSION -- FOLLOW UP NOTE  OZIEL Fellow pager # [phone]  OZIEL Attending phone # [phone]  Nephrology office # [phone]  --------------------------------------------------------------------------------  Chief Complaint: chest pain  HPI: 34F PMHx CKD (unknown etiology, no bx), chronic HTN, proteinuria, s/p scheduled  (2020) p/w elevated BP (180s/90s), chest pain admitted for possible postpartum preeclampsia.  Nephrology consulted for Acute on CKD- on admission sCr 2.39 (baseline SCr 1.5-1.8, d/c sCr 2.2 on ), peaked 2.67.  24 hour events/subjective:  - yesterday evening/overnight BP elevated requiring labetalol and hydralazine IV (asymptomatic), vitals afebrile, total UOP 3.8L/24hr  - patient seen and examined at bedside this morning without complain report chest pain resolved and lower ext improving feels overall well.  no blurry vision, no headaches, no sob.  - vitals/lab/medications reviewed, noted for sCr 2.6 to 2.7 and uptrend Uric acid 9.7 to 9.9 with LDH wnl    PAST HISTORY  --------------------------------------------------------------------------------  No significant changes to PMH, PSH, FHx, SHx, unless otherwise noted    ALLERGIES & MEDICATIONS  --------------------------------------------------------------------------------  Allergies    No Known Allergies    Intolerances      Standing Inpatient Medications  heparin  Injectable 5000 Unit(s) SubCutaneous every 12 hours  labetalol 300 milliGRAM(s) Oral three times a day  NIFEdipine XL 60 milliGRAM(s) Oral every 12 hours  pantoprazole   Suspension 40 milliGRAM(s) Oral daily    PRN Inpatient Medications  acetaminophen   Tablet .. 975 milliGRAM(s) Oral every 6 hours PRN  glycerin Suppository - Adult 1 Suppository(s) Rectal at bedtime PRN  lanolin Ointment 1 Application(s) Topical every 6 hours PRN  magnesium hydroxide Suspension 30 milliLiter(s) Oral two times a day PRN  simethicone 80 milliGRAM(s) Chew every 4 hours PRN  sodium chloride 0.65% Nasal 1 Spray(s) Both Nostrils three times a day PRN      REVIEW OF SYSTEMS  Gen: no fever, chills, weakness, blurry vision  Respiratory: No dyspnea, cough  CV: No chest pain (resolved), orthopnea  GI: No abdominal pain, nausea, vomiting, diarrhea  MSK: no edema  Neuro: No dizziness, lightheadedness, headache  Heme: No bleeding    All other systems were reviewed and are negative, except as noted.    VITALS/PHYSICAL EXAM  --------------------------------------------------------------------------------  T(C): 36.9 (20 @ 10:25), Max: 36.9 (20 @ 05:30)  HR: 87 (20 @ 10:25) (58 - 94)  BP: 126/67 (20 @ 10:25) (124/72 - 183/88)  RR: 18 (20 @ 10:25) (16 - 21)  SpO2: 99% (20 @ 10:25) (97% - 100%)  Wt(kg): --        02-19-20 @ 07:01  -  20 @ 07:00  --------------------------------------------------------  IN: 0 mL / OUT: 3800 mL / NET: -3800 mL    20 @ 07:01  -  20 @ 12:40  --------------------------------------------------------  IN: 0 mL / OUT: 600 mL / NET: -600 mL      Physical Exam:  	Gen: NAD, well-appearing on room air  	HEENT: moist mucous membrane, no JVD, sclera clear  	Pulm: CTA B/L, no crackles  	CV: RRR, S1S2; no rub/murmur  	GI: Abd: +BS, soft  	MSK: Warm, bilateral lower extremity edema +1 (improving)  	Psych: Normal affect and mood  	Skin: Warm, no cyanosis              Neuro: non-focal     LABS/STUDIES  --------------------------------------------------------------------------------              7.3    8.60  >-----------<  306      [20 @ 05:45]              23.8     136  |  109  |  47  ----------------------------<  90      [20 @ 05:45]  4.6   |  15  |  2.71        Ca     8.3     [20 @ 05:45]    TPro  5.6  /  Alb  2.4  /  TBili  < 0.2  /  DBili  x   /  AST  17  /  ALT  34  /  AlkPhos  92  [20 @ 05:45]    PT/INR: PT 10.0 , INR 0.88       [20 05:45]  PTT: 28.5       [20 05:45]    Uric acid 9.9      [20 05:45]  CK 60      [20 05:45]        [20 05:45]    Creatinine Trend:  SCr 2.71 [:45]  SCr 2.67 [:20]  SCr 2.54 [:15]  SCr 2.43 [ 09:13]  SCr 2.39 [:18]    Urinalysis - [20 @ 03:00]      Color COLORLESS / Appearance CLEAR / SG 1.006 / pH 6.5      Gluc NEGATIVE / Ketone NEGATIVE  / Bili NEGATIVE / Urobili NORMAL       Blood SMALL / Protein 100 / Leuk Est NEGATIVE / Nitrite NEGATIVE      RBC 3-5 / WBC 0-2 / Hyaline NEGATIVE / Gran  / Sq Epi OCC / Non Sq Epi  / Bacteria NEGATIVE    Urine Sodium 28      [20 @ 20:30]  Urine Potassium 10.4      [20 @ 20:30]  Urine Chloride 24      [20 @ 20:30]  Urine Osmolality 154      [20 @ 20:30]    Lipid: chol 250, , HDL 53, LDL --      [20 @ 07:54]    HBsAg NEGATIVE      [20 @ 07:01]    ASHLEY: titer Negative, pattern --      [20 07:01]  dsDNA <12      [20 @ 07:01]  C3 Complement 144.0      [20 @ 07:01]  C4 Complement 24.4      [20 @ 07:01]  Syphilis Screen (Treponema Pallidum Ab) Negative      [20 @ 10:00]

## 2020-02-20 NOTE — PROGRESS NOTE ADULT - PROBLEM SELECTOR PLAN 2
BP fluctuating overnight sBP 150-170s, improved now sBP 120-130s  - continue current BP regimen  - Monitor BP.  Low salt diet advised

## 2020-02-20 NOTE — PROGRESS NOTE ADULT - PROBLEM SELECTOR PLAN 1
Neuro: Pain well controlled on current regimen. S/p Keppra. Appreciate neurology consult. CT head with no abnormal findings.   CV: Hemodynamically stable, H/H stable. Continues on labetalol 300 TID and procardia 90XL. Appreciate cardiology consult. Cardiac enzymes neg. Echo w/ mild pulmonary HTN. CXR with small pleural effusions noted.   Pulm: O2 sat WNL on RA, Increase ambulation, encourage incentive spirometry use  GI: Tolerating regular diet  : Voiding spontaneously. Appreciate cardiology consult. Labs pending this AM.   Heme: HSQ, SCDs while in bed  ID: Afebrile, No signs of infection  Dispo: Appreciate neurology, nephrology and cardiology consult. Continue to closely monitor BP, may titrate medications if needed.     PAOLA Mera, PGY-3

## 2020-02-20 NOTE — PROGRESS NOTE ADULT - PROBLEM SELECTOR PLAN 1
KAYLA on CKD (unclear etiology) likely hemodynamically meditated in setting elevated BP ?post partum preeclampsia ( 2020).  CKD unknown etiology   On admission sCr 2.39 (baseline SCr 1.5-1.8, d/c sCr 2.2 on ), today sCr 2.7    Recommendations:  - No absolute indication for dialysis (no hyperkalemia, acidosis, fluid overload, uremia s/s)  - monitor preeclampsia labs (sCr, serum uric acid, platelet, protein/Cr ratio spot, PT/PTT, fibrinogen)  - BP control (goal <140/80)  - avoid nephrotoxic agents (ACEi/ARB, NSAIDS), renally dose medications per GFR  - trend sCr, strict I/O, daily weight, renal/low potassium diet. KAYLA on CKD likely hemodynamically meditated in setting elevated BP and post partum preeclampsia ( 2020).  CKD unknown etiology   On admission sCr 2.39 (baseline SCr 1.5-1.8, d/c sCr 2.2 on ), today sCr 2.7    Recommendations:  - No  indication for dialysis (no hyperkalemia, acidosis, fluid overload, uremia s/s)  - monitor preeclampsia labs (sCr, serum uric acid, platelet, protein/Cr ratio spot, PT/PTT, fibrinogen)  - BP control (goal <140/80)  - avoid nephrotoxic agents (ACEi/ARB, NSAIDS), renally dose medications per GFR  - trend sCr, strict I/O, daily weight, renal/low potassium diet.

## 2020-02-20 NOTE — PROGRESS NOTE ADULT - SUBJECTIVE AND OBJECTIVE BOX
R3 OB Note  HD#2, POD#7     Patient seen and examined at bedside. Overnight patient noted to have severe range BPs requiring IVP labetalol and hydralazine. Remained asymptomatic throughout and denies any symptoms this morning. No acute complaints, pain well controlled.     Denies any HA, blurry vision, lightheadedness, CP/SOB, N/V. Patient is ambulating, voiding spontaneously, passing flatus, and tolerating regular diet.    Vital Signs Last 24 Hours  T(C): 36.9 (02-20-20 @ 05:30), Max: 36.9 (02-20-20 @ 05:30)  HR: 84 (02-20-20 @ 05:30) (58 - 84)  BP: 137/81 (02-20-20 @ 05:30) (120/66 - 183/88)  RR: 18 (02-20-20 @ 05:30) (17 - 21)  SpO2: 99% (02-20-20 @ 05:30) (97% - 100%)    Physical Exam:  General: NAD  CV: RRR  Pulm: CTAB  Abdomen: Soft, non-tender, non-distended  Incision: Pfannenstiel incision CDI, subcuticular suture closure  Extremities: no calf tenderness noted on exam    Labs:    Blood Type: B Positive  Antibody Screen: Negative  RPR: Negative               7.3    8.60  )-----------( 306      ( 02-20 @ 05:45 )             23.8                7.3    9.49  )-----------( 267      ( 02-19 @ 05:20 )             23.3                7.2    10.38 )-----------( 266      ( 02-18 @ 16:15 )             23.4         MEDICATIONS  (STANDING):  heparin  Injectable 5000 Unit(s) SubCutaneous every 12 hours  labetalol 300 milliGRAM(s) Oral three times a day  NIFEdipine XL 90 milliGRAM(s) Oral daily  pantoprazole   Suspension 40 milliGRAM(s) Oral daily    MEDICATIONS  (PRN):  acetaminophen   Tablet .. 975 milliGRAM(s) Oral every 6 hours PRN Moderate Pain (4 - 6)  glycerin Suppository - Adult 1 Suppository(s) Rectal at bedtime PRN Constipation  lanolin Ointment 1 Application(s) Topical every 6 hours PRN Sore Nipples  magnesium hydroxide Suspension 30 milliLiter(s) Oral two times a day PRN Constipation  simethicone 80 milliGRAM(s) Chew every 4 hours PRN Gas  sodium chloride 0.65% Nasal 1 Spray(s) Both Nostrils three times a day PRN Nasal Congestion

## 2020-02-20 NOTE — PROGRESS NOTE ADULT - ASSESSMENT
33yo  POD#7 s/p C/S readmitted with postpartum siPEC with known KAYLA and abdominal/chest discomfort which has now resolved, in stable condition.

## 2020-02-21 ENCOUNTER — TRANSCRIPTION ENCOUNTER (OUTPATIENT)
Age: 35
End: 2020-02-21

## 2020-02-21 VITALS
SYSTOLIC BLOOD PRESSURE: 132 MMHG | RESPIRATION RATE: 17 BRPM | OXYGEN SATURATION: 100 % | HEART RATE: 81 BPM | DIASTOLIC BLOOD PRESSURE: 75 MMHG | TEMPERATURE: 98 F

## 2020-02-21 LAB
ALBUMIN SERPL ELPH-MCNC: 2.8 G/DL — LOW (ref 3.3–5)
ALP SERPL-CCNC: 97 U/L — SIGNIFICANT CHANGE UP (ref 40–120)
ALT FLD-CCNC: 27 U/L — SIGNIFICANT CHANGE UP (ref 4–33)
ANION GAP SERPL CALC-SCNC: 14 MMO/L — SIGNIFICANT CHANGE UP (ref 7–14)
APTT BLD: 28.3 SEC — SIGNIFICANT CHANGE UP (ref 27.5–36.3)
AST SERPL-CCNC: 16 U/L — SIGNIFICANT CHANGE UP (ref 4–32)
BASOPHILS # BLD AUTO: 0.04 K/UL — SIGNIFICANT CHANGE UP (ref 0–0.2)
BASOPHILS NFR BLD AUTO: 0.4 % — SIGNIFICANT CHANGE UP (ref 0–2)
BILIRUB SERPL-MCNC: < 0.2 MG/DL — LOW (ref 0.2–1.2)
BUN SERPL-MCNC: 43 MG/DL — HIGH (ref 7–23)
CALCIUM SERPL-MCNC: 8.7 MG/DL — SIGNIFICANT CHANGE UP (ref 8.4–10.5)
CHLORIDE SERPL-SCNC: 106 MMOL/L — SIGNIFICANT CHANGE UP (ref 98–107)
CO2 SERPL-SCNC: 16 MMOL/L — LOW (ref 22–31)
CREAT SERPL-MCNC: 2.9 MG/DL — HIGH (ref 0.5–1.3)
EOSINOPHIL # BLD AUTO: 0.72 K/UL — HIGH (ref 0–0.5)
EOSINOPHIL NFR BLD AUTO: 8 % — HIGH (ref 0–6)
FIBRINOGEN PPP-MCNC: 742.8 MG/DL — HIGH (ref 350–510)
GLUCOSE SERPL-MCNC: 91 MG/DL — SIGNIFICANT CHANGE UP (ref 70–99)
HCT VFR BLD CALC: 25.4 % — LOW (ref 34.5–45)
HGB BLD-MCNC: 7.7 G/DL — LOW (ref 11.5–15.5)
IMM GRANULOCYTES NFR BLD AUTO: 4.3 % — HIGH (ref 0–1.5)
INR BLD: 0.9 — SIGNIFICANT CHANGE UP (ref 0.88–1.17)
LDH SERPL L TO P-CCNC: 230 U/L — HIGH (ref 135–225)
LYMPHOCYTES # BLD AUTO: 1.38 K/UL — SIGNIFICANT CHANGE UP (ref 1–3.3)
LYMPHOCYTES # BLD AUTO: 15.3 % — SIGNIFICANT CHANGE UP (ref 13–44)
MCHC RBC-ENTMCNC: 28.7 PG — SIGNIFICANT CHANGE UP (ref 27–34)
MCHC RBC-ENTMCNC: 30.3 % — LOW (ref 32–36)
MCV RBC AUTO: 94.8 FL — SIGNIFICANT CHANGE UP (ref 80–100)
MONOCYTES # BLD AUTO: 0.69 K/UL — SIGNIFICANT CHANGE UP (ref 0–0.9)
MONOCYTES NFR BLD AUTO: 7.6 % — SIGNIFICANT CHANGE UP (ref 2–14)
NEUTROPHILS # BLD AUTO: 5.81 K/UL — SIGNIFICANT CHANGE UP (ref 1.8–7.4)
NEUTROPHILS NFR BLD AUTO: 64.4 % — SIGNIFICANT CHANGE UP (ref 43–77)
NRBC # FLD: 0 K/UL — SIGNIFICANT CHANGE UP (ref 0–0)
PLATELET # BLD AUTO: 368 K/UL — SIGNIFICANT CHANGE UP (ref 150–400)
PMV BLD: 10.2 FL — SIGNIFICANT CHANGE UP (ref 7–13)
POTASSIUM SERPL-MCNC: 4.4 MMOL/L — SIGNIFICANT CHANGE UP (ref 3.5–5.3)
POTASSIUM SERPL-SCNC: 4.4 MMOL/L — SIGNIFICANT CHANGE UP (ref 3.5–5.3)
PROT SERPL-MCNC: 6.2 G/DL — SIGNIFICANT CHANGE UP (ref 6–8.3)
PROTHROM AB SERPL-ACNC: 10 SEC — SIGNIFICANT CHANGE UP (ref 9.8–13.1)
RBC # BLD: 2.68 M/UL — LOW (ref 3.8–5.2)
RBC # FLD: 14.3 % — SIGNIFICANT CHANGE UP (ref 10.3–14.5)
SODIUM SERPL-SCNC: 136 MMOL/L — SIGNIFICANT CHANGE UP (ref 135–145)
URATE SERPL-MCNC: 10.3 MG/DL — HIGH (ref 2.5–7)
WBC # BLD: 9.03 K/UL — SIGNIFICANT CHANGE UP (ref 3.8–10.5)
WBC # FLD AUTO: 9.03 K/UL — SIGNIFICANT CHANGE UP (ref 3.8–10.5)

## 2020-02-21 PROCEDURE — 99232 SBSQ HOSP IP/OBS MODERATE 35: CPT | Mod: GC

## 2020-02-21 RX ORDER — LABETALOL HCL 100 MG
1 TABLET ORAL
Qty: 0 | Refills: 0 | DISCHARGE

## 2020-02-21 RX ORDER — NIFEDIPINE 30 MG
1 TABLET, EXTENDED RELEASE 24 HR ORAL
Qty: 56 | Refills: 0
Start: 2020-02-21 | End: 2020-03-19

## 2020-02-21 RX ORDER — LABETALOL HCL 100 MG
1 TABLET ORAL
Qty: 84 | Refills: 0
Start: 2020-02-21 | End: 2020-03-19

## 2020-02-21 RX ORDER — NIFEDIPINE 30 MG
1 TABLET, EXTENDED RELEASE 24 HR ORAL
Qty: 0 | Refills: 0 | DISCHARGE

## 2020-02-21 RX ADMIN — PANTOPRAZOLE SODIUM 40 MILLIGRAM(S): 20 TABLET, DELAYED RELEASE ORAL at 11:53

## 2020-02-21 RX ADMIN — Medication 300 MILLIGRAM(S): at 14:25

## 2020-02-21 RX ADMIN — Medication 975 MILLIGRAM(S): at 00:35

## 2020-02-21 RX ADMIN — HEPARIN SODIUM 5000 UNIT(S): 5000 INJECTION INTRAVENOUS; SUBCUTANEOUS at 11:01

## 2020-02-21 RX ADMIN — SIMETHICONE 80 MILLIGRAM(S): 80 TABLET, CHEWABLE ORAL at 06:19

## 2020-02-21 RX ADMIN — Medication 975 MILLIGRAM(S): at 12:46

## 2020-02-21 RX ADMIN — Medication 300 MILLIGRAM(S): at 06:19

## 2020-02-21 RX ADMIN — Medication 60 MILLIGRAM(S): at 06:19

## 2020-02-21 NOTE — PROGRESS NOTE ADULT - PROBLEM SELECTOR PLAN 1
KAYLA on CKD likely hemodynamically meditated in setting elevated BP and post partum preeclampsia ( 2020).  CKD unknown etiology   On admission sCr 2.39 (baseline SCr 1.5-1.8, d/c sCr 2.2 on ), uptrend sCr 2.9    Recommendations:  - patient will likely need renal biopsy  - No  indication for dialysis (no hyperkalemia, acidosis, fluid overload, uremia s/s)  - monitor preeclampsia labs (sCr, serum uric acid, platelet, protein/Cr ratio spot, PT/PTT, fibrinogen)  - BP control (goal <140/80)  - avoid nephrotoxic agents (ACEi/ARB, NSAIDS), renally dose medications per GFR  - trend sCr, strict I/O, daily weight, renal/low potassium diet.

## 2020-02-21 NOTE — PROGRESS NOTE ADULT - ATTENDING COMMENTS
The patient's care was discussed with the consulting cardiology fellow.  I independently evaluated the patient.    The patient reports that her pain is much improved.   Echocardiography was reviewed and was unremarkable.     The patient's clinical description of her chest pain was consistent with pericarditis, the etiology of which would be unclear. Most commonly this would be related to a viral illness, but an antecedent infection is not recalled. Pericarditis may be associated with rheumatologic diseases, which should also be considered in a young woman with unexplained renal disease. The patient reports previous work-up for lupus that was unrevealing. For now, I agree that conservative management of this patient's symptoms can proceed given her clinical improvement. If this were to recur, the use of colchicine could be considered. This medication seems to be safe during breast feeding.     With regard to the patient's hypertension and renal disease, the patient reports that she developed renal insufficiency during her last pregnancy, but that it was transient and resolved following her pregnancy. She reports that she developed hypertension in approximately 2017, which seems to be too short of a duration to have affected her renal function so adversely. While her hypertension may be related to her decreased renal function, I am skeptical that her hypertension is causative. Additionally, the patient's echocardiography does not demonstrate LVH, which would be expected in a patient with long-standing severe hypertension. The patient has a referral for outpatient nephrology follow-up, and I reiterated the importance of this to her and her family.    Juancarlos Schaeffer MD  Cardiology
Pt seen and examined by me today. I agree with findings, assessment and plan documented in resident note.
long standing proteinuria  pervious Pre-eclampsia with her first pregnancy  pre-eclampsia again with this pregnancy( proteinuria and HTN)  KAYLA on CKD needs kidney biopsy but she wants top recover first. she will follow up with Dr. may next week   monitor I/O  avoid contrast/NSAIDs/ACE-I/ARBs
change Procardia XL to 60mg BID as she has worsening HTn at night  DC magnesium as well.  avoid NSAIDs

## 2020-02-21 NOTE — PROGRESS NOTE ADULT - PROBLEM SELECTOR PLAN 1
Neuro: Pain well controlled on current regimen. S/p Keppra. Appreciate neurology consult. CT head with no abnormal findings.   CV: Hemodynamically stable, H/H stable. Continues on labetalol 300 TID and procardia 60XL BID. Appreciate cardiology consult. Cardiac enzymes neg. Echo w/ mild pulmonary HTN. CXR with small pleural effusions noted.   Pulm: O2 sat WNL on RA, Increase ambulation, encourage incentive spirometry use  GI: Tolerating regular diet  : Voiding spontaneously. Appreciate nephrology consult. Received IV lasix 20 x1 yesterday.    Heme: HSQ, SCDs while in bed  ID: Afebrile, No signs of infection  Dispo: Appreciate neurology, nephrology and cardiology consult. Continue to closely monitor BP.     PAOLA Mera, PGY-3

## 2020-02-21 NOTE — PROGRESS NOTE ADULT - PROBLEM SELECTOR PLAN 2
BP improved, overnight sBP 110-120s  - continue current BP regimen procardia 60 BID and labetalol 300 TID  - Monitor BP.  Low salt diet advised

## 2020-02-21 NOTE — PROGRESS NOTE ADULT - ASSESSMENT
33yo  POD#8 s/p C/S readmitted with postpartum siPEC with known KAYLA and abdominal/chest discomfort which has now resolved, in stable condition.

## 2020-02-21 NOTE — DISCHARGE NOTE NURSING/CASE MANAGEMENT/SOCIAL WORK - PATIENT PORTAL LINK FT
You can access the FollowMyHealth Patient Portal offered by [facility] by registering at the following website: [email or web address]. By joining [facility]’s FollowMyHealth portal, you will also be able to view your health information using other applications (apps) compatible with our system.

## 2020-02-21 NOTE — PROGRESS NOTE ADULT - SUBJECTIVE AND OBJECTIVE BOX
[facility] DIVISION OF KIDNEY DISEASES AND HYPERTENSION -- FOLLOW UP NOTE  OZIEL Fellow pager # [phone]  OZIEL Attending phone # [phone]  Nephrology office # [phone]  --------------------------------------------------------------------------------  Chief Complaint: chest pain (resolved)    HPI: 34F PMHx CKD (unknown etiology, no bx), chronic HTN, proteinuria, s/p scheduled  (2020) p/w elevated BP (180s/90s), chest pain admitted for possible postpartum preeclampsia.  Nephrology consulted for Acute on CKD- on admission sCr 2.39 (baseline SCr 1.5-1.8, d/c sCr 2.2 on ), peaked 2.67.  24 hour events/subjective:  - no overnight events, BP better controlled sBP 116-120s/60s, vitals afebrile, total UOP 6.4L/24hr  - patient seen and examined at bedside this morning without complai, denies any chest pain, sob, blurry vision, headache  - vitals/lab/medications reviewed, noted for sCr 2.7 to 2.9, K 4.4    PAST HISTORY  --------------------------------------------------------------------------------  No significant changes to PMH, PSH, FHx, SHx, unless otherwise noted    ALLERGIES & MEDICATIONS  --------------------------------------------------------------------------------  Allergies    No Known Allergies    Intolerances      Standing Inpatient Medications  heparin  Injectable 5000 Unit(s) SubCutaneous every 12 hours  labetalol 300 milliGRAM(s) Oral three times a day  NIFEdipine XL 60 milliGRAM(s) Oral every 12 hours  pantoprazole   Suspension 40 milliGRAM(s) Oral daily    PRN Inpatient Medications  acetaminophen   Tablet .. 975 milliGRAM(s) Oral every 6 hours PRN  glycerin Suppository - Adult 1 Suppository(s) Rectal at bedtime PRN  lanolin Ointment 1 Application(s) Topical every 6 hours PRN  magnesium hydroxide Suspension 30 milliLiter(s) Oral two times a day PRN  simethicone 80 milliGRAM(s) Chew every 4 hours PRN  sodium chloride 0.65% Nasal 1 Spray(s) Both Nostrils three times a day PRN      REVIEW OF SYSTEMS  Gen: no fever, chills, weakness, blurry vision  Respiratory: No dyspnea, cough  CV: No chest pain (resolved), orthopnea  GI: No abdominal pain, nausea, vomiting, diarrhea  MSK: no edema  Neuro: No dizziness, lightheadedness, headache  Heme: No bleeding    All other systems were reviewed and are negative, except as noted.    VITALS/PHYSICAL EXAM  --------------------------------------------------------------------------------  T(C): 37.2 (20 @ 09:49), Max: 37.2 (20 @ 18:00)  HR: 83 (20 @ 09:49) (80 - 90)  BP: 134/73 (20 @ 09:49) (115/62 - 142/75)  RR: 16 (20 @ 09:49) (16 - 18)  SpO2: 99% (20 @ 09:49) (97% - 100%)  Wt(kg): --        20 @ 07:01  -  20 @ 07:00  --------------------------------------------------------  IN: 0 mL / OUT: 6450 mL / NET: -6450 mL    20 @ 07:01  -  20 @ 10:19  --------------------------------------------------------  IN: 0 mL / OUT: 0 mL / NET: 0 mL      Physical Exam:  	Gen: NAD, well-appearing on room air  	HEENT: moist mucous membrane, no JVD, sclera clear  	Pulm: CTA B/L, no crackles  	CV: RRR, S1S2; no rub/murmur  	GI: Abd: +BS, soft  	MSK: Warm, bilateral lower extremity edema +1 (improving)  	Psych: Normal affect and mood  	Skin: Warm, no cyanosis              Neuro: non-focal     LABS/STUDIES  --------------------------------------------------------------------------------              7.7    9.03  >-----------<  368      [20 @ 05:20]              25.4     136  |  106  |  43  ----------------------------<  91      [20 @ 05:20]  4.4   |  16  |  2.90        Ca     8.7     [20 @ 05:20]    TPro  6.2  /  Alb  2.8  /  TBili  < 0.2  /  DBili  x   /  AST  16  /  ALT  27  /  AlkPhos  97  [20 @ 05:20]    PT/INR: PT 10.0 , INR 0.90       [20 @ 05:20]  PTT: 28.3       [20 05:20]    Uric acid 10.3      [20 05:20]  CK 60      [20 05:45]        [20 05:20]    Creatinine Trend:  SCr 2.90 [ 05:20]  SCr 2.71 [ 05:45]  SCr 2.67 [ 05:20]  SCr 2.54 [ 16:15]  SCr 2.43 [ 09:13]    Urinalysis - [20 @ 03:00]      Color COLORLESS / Appearance CLEAR / SG 1.006 / pH 6.5      Gluc NEGATIVE / Ketone NEGATIVE  / Bili NEGATIVE / Urobili NORMAL       Blood SMALL / Protein 100 / Leuk Est NEGATIVE / Nitrite NEGATIVE      RBC 3-5 / WBC 0-2 / Hyaline NEGATIVE / Gran  / Sq Epi OCC / Non Sq Epi  / Bacteria NEGATIVE    Urine Sodium 28      [20 @ 20:30]  Urine Potassium 10.4      [20 @ 20:30]  Urine Chloride 24      [20 @ 20:30]  Urine Osmolality 154      [20 @ 20:30]    Lipid: chol 250, , HDL 53, LDL --      [20 @ 07:54]    HBsAg NEGATIVE      [20 @ 07:01]    ASHLEY: titer Negative, pattern --      [20 @ 07:01]  dsDNA <12      [20 @ 07:01]  C3 Complement 144.0      [20 @ 07:01]  C4 Complement 24.4      [20 @ 07:01]  Syphilis Screen (Treponema Pallidum Ab) Negative      [20 @ 10:00]

## 2020-02-21 NOTE — PROGRESS NOTE ADULT - SUBJECTIVE AND OBJECTIVE BOX
R3 OB Note  HD#4, POD#8     Patient seen and examined at bedside, no acute overnight events. No acute complaints, pain well controlled. Reports feeling much better. BPs remained well controlled overnight.     Denies any HA, blurry vision, lightheadedness, CP/SOB, N/V. Patient is ambulating, voiding spontaneously, passing flatus, and tolerating regular diet.    Vital Signs Last 24 Hours  T(C): 36.8 (02-21-20 @ 05:12), Max: 37.2 (02-20-20 @ 18:00)  HR: 88 (02-21-20 @ 06:18) (80 - 94)  BP: 124/71 (02-21-20 @ 06:18) (115/62 - 142/75)  RR: 18 (02-21-20 @ 06:18) (16 - 18)  SpO2: 97% (02-21-20 @ 06:18) (97% - 100%)    Physical Exam:  General: NAD  CV: RRR  Pulm: CTAB  Abdomen: Soft, non-tender, non-distended  Incision: Pfannenstiel incision CDI, subcuticular suture closure w/ steri strips   Extremities: no calf tenderness noted on exam    Labs:    Blood Type: B Positive  Antibody Screen: Negative  RPR: Negative               7.7    9.03  )-----------( 368      ( 02-21 @ 05:20 )             25.4                7.3    8.60  )-----------( 306      ( 02-20 @ 05:45 )             23.8                7.3    9.49  )-----------( 267      ( 02-19 @ 05:20 )             23.3         MEDICATIONS  (STANDING):  heparin  Injectable 5000 Unit(s) SubCutaneous every 12 hours  labetalol 300 milliGRAM(s) Oral three times a day  NIFEdipine XL 60 milliGRAM(s) Oral every 12 hours  pantoprazole   Suspension 40 milliGRAM(s) Oral daily    MEDICATIONS  (PRN):  acetaminophen   Tablet .. 975 milliGRAM(s) Oral every 6 hours PRN Moderate Pain (4 - 6)  glycerin Suppository - Adult 1 Suppository(s) Rectal at bedtime PRN Constipation  lanolin Ointment 1 Application(s) Topical every 6 hours PRN Sore Nipples  magnesium hydroxide Suspension 30 milliLiter(s) Oral two times a day PRN Constipation  simethicone 80 milliGRAM(s) Chew every 4 hours PRN Gas  sodium chloride 0.65% Nasal 1 Spray(s) Both Nostrils three times a day PRN Nasal Congestion

## 2020-02-21 NOTE — DISCHARGE NOTE NURSING/CASE MANAGEMENT/SOCIAL WORK - NSDCFUADDAPPT_GEN_ALL_CORE_FT
- Continue BP meds as prescribed (hold is BP is under 110/60 or HR is under 60)  -Take blood pressure with at home cuff prior to taking medications; if BP is >150/90 call MD  - Return to hospital with headaches, visual changes, abdominal pain, nausea, vomiting, chest pain or shortness of breathe  - Follow up with OB in 2 days for BP check  - Follow up with Cardiology   - Follow up with Nephrology within one week

## 2020-02-21 NOTE — PROGRESS NOTE ADULT - REASON FOR ADMISSION
Elevated BPs, Headache, Right sided chest pain
High blood pressure. Headache.
Elevated BPs, Headache, Right sided chest pain
Elevated BPs, Headache, Right sided chest pain

## 2020-02-24 LAB — CRYOGLOB SERPL-MCNC: 0 — SIGNIFICANT CHANGE UP

## 2020-02-25 ENCOUNTER — APPOINTMENT (OUTPATIENT)
Dept: OBGYN | Facility: CLINIC | Age: 35
End: 2020-02-25
Payer: COMMERCIAL

## 2020-02-25 VITALS
HEIGHT: 63 IN | SYSTOLIC BLOOD PRESSURE: 146 MMHG | BODY MASS INDEX: 30.19 KG/M2 | DIASTOLIC BLOOD PRESSURE: 90 MMHG | HEART RATE: 80 BPM | WEIGHT: 170.4 LBS

## 2020-02-25 DIAGNOSIS — Z98.891 HISTORY OF UTERINE SCAR FROM PREVIOUS SURGERY: ICD-10-CM

## 2020-02-25 PROCEDURE — 0503F POSTPARTUM CARE VISIT: CPT

## 2020-02-25 NOTE — HISTORY OF PRESENT ILLNESS
[Delivery Date: ___] : on [unfilled] [Repeat C/S] : delivered by  section (repeat) [Wt. ___] : weighing [unfilled] [Female] : Delivery History: baby girl [Breastfeeding] : currently nursing [Clean/Dry/Intact] : clean, dry and intact [Erythema] : not erythematous [Swelling] : not swollen [Dehiscence] : not dehisced [Healed] : healed [Back to Normal] : is still enlarged [None] : no vaginal bleeding [Normal] : the vagina was normal [Not Done] : Examination of breasts not done [Cervix Sample Taken] : cervical sample not taken for a Pap smear [Slow Progress] : is progressing slowly [FreeTextEntry8] : pt presents for follow up, s/p readmission for superimposed pre-eclampsia. pt is s/p repeat LST C/S 2/13/20 live female " Kay", 5lbs 3oz. now 12 days PP. pt was readmitted POD # 5 for severe range B.P.'s, and discharged 2/21 on Procardia 60mg  q 12 hours and Labetalol 400mg TID. pt reports episodes of hypotension during the day. pt was delivered at 37 weeks  [de-identified] : due to episodes of hypotension, will cut back on the Labetalol to 300mg TID. f/u here  in 1 week for B.P. check  [de-identified] : 12 days post op s/p RLST C/S at 37 weeks for severe superimposed pre-eclampsia, and s/p readmission on POD # 5 for exacerbation of B.P.'s,

## 2020-02-27 ENCOUNTER — APPOINTMENT (OUTPATIENT)
Dept: NEPHROLOGY | Facility: CLINIC | Age: 35
End: 2020-02-27
Payer: COMMERCIAL

## 2020-02-27 ENCOUNTER — LABORATORY RESULT (OUTPATIENT)
Age: 35
End: 2020-02-27

## 2020-02-27 VITALS
OXYGEN SATURATION: 99 % | SYSTOLIC BLOOD PRESSURE: 122 MMHG | HEIGHT: 67 IN | DIASTOLIC BLOOD PRESSURE: 81 MMHG | WEIGHT: 164 LBS | BODY MASS INDEX: 25.74 KG/M2 | HEART RATE: 80 BPM

## 2020-02-27 DIAGNOSIS — Z82.49 FAMILY HISTORY OF ISCHEMIC HEART DISEASE AND OTHER DISEASES OF THE CIRCULATORY SYSTEM: ICD-10-CM

## 2020-02-27 DIAGNOSIS — Z83.3 FAMILY HISTORY OF DIABETES MELLITUS: ICD-10-CM

## 2020-02-27 PROCEDURE — 99205 OFFICE O/P NEW HI 60 MIN: CPT

## 2020-02-27 RX ORDER — FAMOTIDINE 40 MG/1
40 TABLET, FILM COATED ORAL DAILY
Qty: 30 | Refills: 3 | Status: DISCONTINUED | COMMUNITY
Start: 2019-07-23 | End: 2020-02-27

## 2020-02-27 RX ORDER — CETIRIZINE HYDROCHLORIDE 10 MG/1
10 TABLET, COATED ORAL
Qty: 30 | Refills: 0 | Status: DISCONTINUED | COMMUNITY
Start: 2019-04-27 | End: 2020-02-27

## 2020-02-27 RX ORDER — FEXOFENADINE HYDROCHLORIDE 180 MG/1
180 TABLET ORAL
Qty: 30 | Refills: 0 | Status: DISCONTINUED | COMMUNITY
Start: 2019-02-05 | End: 2020-02-27

## 2020-02-27 RX ORDER — LABETALOL HYDROCHLORIDE 200 MG/1
200 TABLET, FILM COATED ORAL
Qty: 60 | Refills: 3 | Status: DISCONTINUED | COMMUNITY
Start: 2019-11-26 | End: 2020-02-27

## 2020-02-27 RX ORDER — DOCUSATE SODIUM 100 MG/1
100 CAPSULE, LIQUID FILLED ORAL TWICE DAILY
Qty: 90 | Refills: 1 | Status: DISCONTINUED | COMMUNITY
Start: 2019-12-13 | End: 2020-02-27

## 2020-02-27 RX ORDER — CHLORHEXIDINE GLUCONATE 4 %
325 (65 FE) LIQUID (ML) TOPICAL DAILY
Qty: 90 | Refills: 1 | Status: DISCONTINUED | COMMUNITY
Start: 2019-12-13 | End: 2020-02-27

## 2020-02-27 RX ORDER — LABETALOL HYDROCHLORIDE 200 MG/1
200 TABLET, FILM COATED ORAL
Qty: 60 | Refills: 3 | Status: DISCONTINUED | COMMUNITY
Start: 2019-08-06 | End: 2020-02-27

## 2020-02-27 RX ORDER — FLUTICASONE PROPIONATE 50 UG/1
50 SPRAY, METERED NASAL
Qty: 16 | Refills: 0 | Status: DISCONTINUED | COMMUNITY
Start: 2019-02-05 | End: 2020-02-27

## 2020-02-27 RX ORDER — LABETALOL HYDROCHLORIDE 200 MG/1
200 TABLET, FILM COATED ORAL EVERY 8 HOURS
Qty: 180 | Refills: 0 | Status: DISCONTINUED | COMMUNITY
Start: 2020-02-22 | End: 2020-02-27

## 2020-02-27 RX ORDER — LABETALOL HYDROCHLORIDE 200 MG/1
200 TABLET, FILM COATED ORAL
Qty: 60 | Refills: 0 | Status: DISCONTINUED | COMMUNITY
Start: 2019-02-20 | End: 2020-02-27

## 2020-02-27 NOTE — ASSESSMENT
[FreeTextEntry1] : 34 year old woman with h/o HTNx 3 years, postpartum PEC 3 years ago complicated by proteinuria, KAYLA, HTN which all never completely resolved, and now s/p pregnancy postpartum with likely PEC again, KAYLA, and proteinuria\par \par #HTN- controlled; chronic HTN + PEC related \par -will need secondary workup\par -cont nifedipine 60mg bid; continue Labetolol 300mg tid\par -she will cont to monitor BP tid and we may have to adjust according to BP\par -low salt diet\par \par #KAYLA- ?ATN at time of Hypertensive Urgency but also given Lasix vs other\par -monitor trend for now\par \par #proteinuria- quantitate; check u prot/cr;  she has some spotting still; hiv, hbv, hcv, c3,c4 rpr, normal inpatient labs\par check pla2r, free light chains, hba1c\par \par #anemia- check cbc, iron studies, ldh, haptoglobin\par platelets normal\par \par #check PEC labs, uric acid

## 2020-02-27 NOTE — PHYSICAL EXAM
[General Appearance - Alert] : alert [General Appearance - In No Acute Distress] : in no acute distress [General Appearance - Well Nourished] : well nourished [General Appearance - Well Developed] : well developed [Sclera] : the sclera and conjunctiva were normal [General Appearance - Well-Appearing] : healthy appearing [Neck Appearance] : the appearance of the neck was normal [Outer Ear] : the ears and nose were normal in appearance [Neck Cervical Mass (___cm)] : no neck mass was observed [Jugular Venous Distention Increased] : there was no jugular-venous distention [Respiration, Rhythm And Depth] : normal respiratory rhythm and effort [Apical Impulse] : the apical impulse was normal [Auscultation Breath Sounds / Voice Sounds] : lungs were clear to auscultation bilaterally [Heart Rate And Rhythm] : heart rate was normal and rhythm regular [Heart Sounds] : normal S1 and S2 [Bowel Sounds] : normal bowel sounds [Abdomen Soft] : soft [Abdomen Tenderness] : non-tender [Abnormal Walk] : normal gait [No CVA Tenderness] : no ~M costovertebral angle tenderness [Musculoskeletal - Swelling] : no joint swelling seen [Skin Color & Pigmentation] : normal skin color and pigmentation [Skin Turgor] : normal skin turgor [No Focal Deficits] : no focal deficits [] : no rash [Impaired Insight] : insight and judgment were intact [Oriented To Time, Place, And Person] : oriented to person, place, and time [FreeTextEntry1] : mild 1+ to mid shin

## 2020-02-27 NOTE — HISTORY OF PRESENT ILLNESS
[FreeTextEntry1] : 34 year old with past medical history of HTN \par \par HTN hx- she stated HTN was diagnosed 3years post partum when he had post partum preeclampsia\par HTN persisted and then was labelled as chronic hypertension; also noted high cr/KAYLA at that time.  She stated that she followed up post partum with Nephrologist, DR Guzman for six months.  She reports the creatinine had improved 6 months later; GFR which was "50 postpartum went back up to to 75-80" per patient and [place]. They report her creatinine settling somewhere at 1.2; stated that she also had proteinuria but it never went away (3+ to 1+ ); She was told she did not need a renal biopsy because everything was improving.\par \par This is her third pregnancy; first pregnancy 2015 resulted in a  miscarriage at 8 weeks; second pregnancy 2017- baby female- delivered at approx 38-39weeks, breech baby required C section, one week later she developed  HTN and Postpartum PEC, HTN, proteinuria and KAYLA; Was followed by Dr Slater at [facility].   \par \par Second pregnancy (more recently) delivered Feb 13th,2020 via C section and was approx 38 weeks.  DONN was Feb 29th.  Baby female is fine, no complications. Patient is currently breastfeeding and formula feeding.  Feb 17th she was discharged from the hospital and went home and then readmitted the same day due to /90s, She was on nifedipine 90mg daily and Labetolol 200mg tid which was increased to Labetolol  300mg tid and nifedipine  60/60 and Labetlol from 200mg tid to 300mg tid; She states that she had known PEC at the time of delivery as well and was on magnesium which they had to stop because it didn’t make her feel well. She reports BP at home Bp 130-140/80-85\par  She stated she had proteinuria at the start of this pregnancy as well.\par \par Also developed KAYLA postpartum this admission: Cr trend July 2019 cr 1.3 (she ws pregnant)\par Feb 2.1-2.2- scheduled c section; -160sys at that time\par Patient's postpartum cr 2.5 then  cr 2.2-2.3 but when she was readmitted her recent cr 2.5-->2.6-->2.7-->2.9\par \par Past surgical hx: 2 c sesctions; appendix removed [id number]\par Fam hx:\par father DM, HTN, CAD\par mother Asthma\par brother- healthy; 31\par \par Soc hx: works for [employer]; currently on maternity leave; \par denies drugs, smoking, etoh\par \par medications: labet and nifedipine; took baby asa during pregnancy\par 90nif, 200tid, hctz during preg labet 200bid, nif 90; no NSAIDS\par \par has some spotting\par no pec\par leg swelling\par some irritation\par no burning\par peeing more\par 122/80\par \par

## 2020-02-28 LAB
ALBUMIN MFR SERPL ELPH: 49.3 %
ALBUMIN SERPL ELPH-MCNC: 3.7 G/DL
ALBUMIN SERPL ELPH-MCNC: 3.7 G/DL
ALBUMIN SERPL-MCNC: 3.4 G/DL
ALBUMIN/GLOB SERPL: 1 RATIO
ALP BLD-CCNC: 85 U/L
ALPHA1 GLOB MFR SERPL ELPH: 7 %
ALPHA1 GLOB SERPL ELPH-MCNC: 0.5 G/DL
ALPHA2 GLOB MFR SERPL ELPH: 15.9 %
ALPHA2 GLOB SERPL ELPH-MCNC: 1.1 G/DL
ALT SERPL-CCNC: 15 U/L
ANION GAP SERPL CALC-SCNC: 14 MMOL/L
APPEARANCE: CLEAR
AST SERPL-CCNC: 10 U/L
B-GLOBULIN MFR SERPL ELPH: 13.9 %
B-GLOBULIN SERPL ELPH-MCNC: 0.9 G/DL
BACTERIA: ABNORMAL
BASOPHILS # BLD AUTO: 0.03 K/UL
BASOPHILS NFR BLD AUTO: 0.3 %
BILIRUB DIRECT SERPL-MCNC: 0.1 MG/DL
BILIRUB INDIRECT SERPL-MCNC: 0.2 MG/DL
BILIRUB SERPL-MCNC: 0.2 MG/DL
BILIRUBIN URINE: NEGATIVE
BLOOD URINE: ABNORMAL
BUN SERPL-MCNC: 41 MG/DL
C3 SERPL-MCNC: 197 MG/DL
C4 SERPL-MCNC: 34 MG/DL
CALCIUM SERPL-MCNC: 9.4 MG/DL
CHLORIDE SERPL-SCNC: 108 MMOL/L
CO2 SERPL-SCNC: 18 MMOL/L
COLOR: NORMAL
CREAT SERPL-MCNC: 3.17 MG/DL
CREAT SPEC-SCNC: 57 MG/DL
CREAT/PROT UR: 5.9 RATIO
DEPRECATED KAPPA LC FREE/LAMBDA SER: 1.06 RATIO
DSDNA AB SER-ACNC: <12 IU/ML
EOSINOPHIL # BLD AUTO: 0.98 K/UL
EOSINOPHIL NFR BLD AUTO: 8.3 %
ESTIMATED AVERAGE GLUCOSE: 103 MG/DL
FERRITIN SERPL-MCNC: 115 NG/ML
GAMMA GLOB FLD ELPH-MCNC: 0.9 G/DL
GAMMA GLOB MFR SERPL ELPH: 13.9 %
GLUCOSE QUALITATIVE U: NEGATIVE
GLUCOSE SERPL-MCNC: 94 MG/DL
HAPTOGLOB SERPL-MCNC: 280 MG/DL
HBA1C MFR BLD HPLC: 5.2 %
HCT VFR BLD CALC: 30.1 %
HGB BLD-MCNC: 9.3 G/DL
HYALINE CASTS: 0 /LPF
IMM GRANULOCYTES NFR BLD AUTO: 1.3 %
INTERPRETATION SERPL IEP-IMP: NORMAL
IRON SATN MFR SERPL: 16 %
IRON SERPL-MCNC: 54 UG/DL
KAPPA LC CSF-MCNC: 8.46 MG/DL
KAPPA LC SERPL-MCNC: 8.95 MG/DL
KETONES URINE: NEGATIVE
LDH SERPL-CCNC: 251 U/L
LEUKOCYTE ESTERASE URINE: ABNORMAL
LYMPHOCYTES # BLD AUTO: 0.88 K/UL
LYMPHOCYTES NFR BLD AUTO: 7.5 %
M PROTEIN SPEC IFE-MCNC: NORMAL
MAGNESIUM SERPL-MCNC: 1.7 MG/DL
MAN DIFF?: NORMAL
MCHC RBC-ENTMCNC: 29.2 PG
MCHC RBC-ENTMCNC: 30.9 GM/DL
MCV RBC AUTO: 94.4 FL
MICROSCOPIC-UA: NORMAL
MONOCYTES # BLD AUTO: 0.51 K/UL
MONOCYTES NFR BLD AUTO: 4.3 %
NEUTROPHILS # BLD AUTO: 9.23 K/UL
NEUTROPHILS NFR BLD AUTO: 78.3 %
NITRITE URINE: NEGATIVE
PH URINE: 6.5
PHOSPHATE SERPL-MCNC: 4.7 MG/DL
PLATELET # BLD AUTO: 424 K/UL
POTASSIUM SERPL-SCNC: 4.7 MMOL/L
PROT SERPL-MCNC: 6.8 G/DL
PROT UR-MCNC: 332 MG/DL
PROTEIN URINE: ABNORMAL
RBC # BLD: 3.19 M/UL
RBC # FLD: 14.2 %
RED BLOOD CELLS URINE: 6 /HPF
SODIUM SERPL-SCNC: 139 MMOL/L
SPECIFIC GRAVITY URINE: 1.01
SQUAMOUS EPITHELIAL CELLS: >27 /HPF
TIBC SERPL-MCNC: 332 UG/DL
UIBC SERPL-MCNC: 278 UG/DL
URATE SERPL-MCNC: 9.5 MG/DL
URINE COMMENTS: NORMAL
UROBILINOGEN URINE: NORMAL
WBC # FLD AUTO: 11.78 K/UL
WHITE BLOOD CELLS URINE: 14 /HPF

## 2020-03-02 LAB — VEGF SERPL-MCNC: SIGNIFICANT CHANGE UP

## 2020-03-03 ENCOUNTER — APPOINTMENT (OUTPATIENT)
Dept: OBGYN | Facility: CLINIC | Age: 35
End: 2020-03-03
Payer: COMMERCIAL

## 2020-03-03 VITALS
SYSTOLIC BLOOD PRESSURE: 138 MMHG | DIASTOLIC BLOOD PRESSURE: 87 MMHG | HEIGHT: 67 IN | BODY MASS INDEX: 26.21 KG/M2 | WEIGHT: 167 LBS

## 2020-03-03 PROCEDURE — 0503F POSTPARTUM CARE VISIT: CPT

## 2020-03-03 NOTE — HISTORY OF PRESENT ILLNESS
[Delivery Date: ___] : on [unfilled] [Female] : Delivery History: baby girl [Repeat C/S] : delivered by  section (repeat) [Breastfeeding] : currently nursing [None] : The patient is currently asymptomatic [Clean/Dry/Intact] : clean, dry and intact [Erythema] : not erythematous [Swelling] : not swollen [Dehiscence] : not dehisced [Healed] : healed [Back to Normal] : is still enlarged [Mild] : mild vaginal bleeding [Cervix Sample Taken] : cervical sample not taken for a Pap smear [Not Done] : Examination of breasts not done [Slow Progress] : is progressing slowly [de-identified] : 19 days post op. s/p RLST C/S superimposed pre-eclampsia [FreeTextEntry8] : pt presents for follow up, s/p RLSt C/S 2/13/20 , " Kay' and readmission for superimposed preeclampsia. pt is now 19 days PP. pt is being followed weekly for B.P. checks and adjustment of meds. pt is currently on Labetalol 300mg TID and Procardia  60mg BID.  [de-identified] : pt's blood pressures are trending downwards. will decrease Labetalol to 200mg TID and keep Procardia at 60mg BID , f/u 1 week.

## 2020-03-04 LAB
ALBUMIN SERPL ELPH-MCNC: 3.5 G/DL
ANA PAT FLD IF-IMP: ABNORMAL
ANA SER IF-ACNC: ABNORMAL
ANION GAP SERPL CALC-SCNC: 14 MMOL/L
APPEARANCE: CLEAR
BACTERIA UR CULT: NORMAL
BACTERIA: NEGATIVE
BASOPHILS # BLD AUTO: 0.03 K/UL
BASOPHILS NFR BLD AUTO: 0.4 %
BILIRUBIN URINE: NEGATIVE
BLOOD URINE: ABNORMAL
BUN SERPL-MCNC: 37 MG/DL
CALCIUM SERPL-MCNC: 8.6 MG/DL
CHLORIDE SERPL-SCNC: 110 MMOL/L
CO2 SERPL-SCNC: 17 MMOL/L
COLOR: NORMAL
CREAT SERPL-MCNC: 2.93 MG/DL
CREAT SPEC-SCNC: 66 MG/DL
CREAT/PROT UR: 5.7 RATIO
EOSINOPHIL # BLD AUTO: 0.89 K/UL
EOSINOPHIL NFR BLD AUTO: 11.9 %
GBM AB TITR SER IF: <1 AL
GLUCOSE QUALITATIVE U: NEGATIVE
GLUCOSE SERPL-MCNC: 131 MG/DL
HCT VFR BLD CALC: 28.4 %
HGB BLD-MCNC: 8.9 G/DL
HYALINE CASTS: 3 /LPF
IMM GRANULOCYTES NFR BLD AUTO: 0.9 %
KETONES URINE: NEGATIVE
LEUKOCYTE ESTERASE URINE: NEGATIVE
LYMPHOCYTES # BLD AUTO: 0.99 K/UL
LYMPHOCYTES NFR BLD AUTO: 13.3 %
MAN DIFF?: NORMAL
MCHC RBC-ENTMCNC: 29 PG
MCHC RBC-ENTMCNC: 31.3 GM/DL
MCV RBC AUTO: 92.5 FL
MICROSCOPIC-UA: NORMAL
MONOCYTES # BLD AUTO: 0.35 K/UL
MONOCYTES NFR BLD AUTO: 4.7 %
MPO AB + PR3 PNL SER: NORMAL
NEUTROPHILS # BLD AUTO: 5.12 K/UL
NEUTROPHILS NFR BLD AUTO: 68.8 %
NITRITE URINE: NEGATIVE
PH URINE: 6.5
PHOSPHATE SERPL-MCNC: 4.2 MG/DL
PHOSPHOLIPASE A2 RECEPTOR ELISA: <2 RU/ML
PHOSPHOLIPASE A2 RECEPTOR IFA: NEGATIVE
PLATELET # BLD AUTO: 322 K/UL
POTASSIUM SERPL-SCNC: 4.5 MMOL/L
PROT UR-MCNC: 378 MG/DL
PROTEIN URINE: ABNORMAL
RBC # BLD: 3.07 M/UL
RBC # FLD: 14.3 %
RED BLOOD CELLS URINE: 7 /HPF
SODIUM SERPL-SCNC: 140 MMOL/L
SPECIFIC GRAVITY URINE: 1.01
SQUAMOUS EPITHELIAL CELLS: 2 /HPF
UROBILINOGEN URINE: NORMAL
WBC # FLD AUTO: 7.45 K/UL
WHITE BLOOD CELLS URINE: 4 /HPF

## 2020-03-06 LAB — VASCULAR ENDOTHELIAL GF: NORMAL

## 2020-03-10 ENCOUNTER — APPOINTMENT (OUTPATIENT)
Dept: OBGYN | Facility: CLINIC | Age: 35
End: 2020-03-10
Payer: COMMERCIAL

## 2020-03-10 VITALS
SYSTOLIC BLOOD PRESSURE: 117 MMHG | HEIGHT: 67 IN | BODY MASS INDEX: 25.74 KG/M2 | DIASTOLIC BLOOD PRESSURE: 80 MMHG | WEIGHT: 164 LBS | HEART RATE: 93 BPM

## 2020-03-10 DIAGNOSIS — O11.9 PRE-EXISTING HYPERTENSION WITH PRE-ECLAMPSIA, UNSPECIFIED TRIMESTER: ICD-10-CM

## 2020-03-10 PROCEDURE — 0503F POSTPARTUM CARE VISIT: CPT

## 2020-03-10 NOTE — HISTORY OF PRESENT ILLNESS
[Postpartum Follow Up] : postpartum follow up [Delivery Date: ___] : on [unfilled] [Repeat C/S] : delivered by  section (repeat) [Female] : Delivery History: baby girl [Girl] : baby is a girl [Infant's Name ___] : [unfilled] [___ Lbs] : [unfilled] lbs [___ Oz] : [unfilled] oz [Living at Home] : is currently living at home [Bottle Feeding] : bottle feeding [Breastfeeding] : currently nursing [Complications:___] : no complications [Resumed Menses] : has not resumed her menses [Resumed Hasty] : has not resumed intercourse [Intended Contraception] : the patient does not intended to use contraception postpartum [None] : No associated symptoms are reported [Doing Well] : is doing well [FreeTextEntry8] : pt presents at 4 weeks PP for weekly B.P. checks, pt is currently on Procardia 60mg BID, and Labetalol 200mg TID  [de-identified] : deferred [de-identified] : 4 weeks PP s/p RLST C/S severe superimposed pre-eclampsia [de-identified] : will decrease Labetalol to 200mg BID and Procardia the same at 60mg BID. f/u 2 weeks for final PP exam.

## 2020-03-11 ENCOUNTER — TRANSCRIPTION ENCOUNTER (OUTPATIENT)
Age: 35
End: 2020-03-11

## 2020-03-16 LAB
ALBUMIN SERPL ELPH-MCNC: 3.7 G/DL
ANION GAP SERPL CALC-SCNC: 15 MMOL/L
APTT BLD: 30.7 SEC
BASOPHILS # BLD AUTO: 0.03 K/UL
BASOPHILS NFR BLD AUTO: 0.4 %
BUN SERPL-MCNC: 35 MG/DL
CALCIUM SERPL-MCNC: 8.5 MG/DL
CHLORIDE SERPL-SCNC: 111 MMOL/L
CO2 SERPL-SCNC: 17 MMOL/L
CREAT SERPL-MCNC: 3.1 MG/DL
EOSINOPHIL # BLD AUTO: 1.03 K/UL
EOSINOPHIL NFR BLD AUTO: 12.2 %
GLUCOSE SERPL-MCNC: 98 MG/DL
HCT VFR BLD CALC: 28.1 %
HGB BLD-MCNC: 8.8 G/DL
IMM GRANULOCYTES NFR BLD AUTO: 0.5 %
INR PPP: 0.95 RATIO
LYMPHOCYTES # BLD AUTO: 1.01 K/UL
LYMPHOCYTES NFR BLD AUTO: 11.9 %
MAN DIFF?: NORMAL
MCHC RBC-ENTMCNC: 28.6 PG
MCHC RBC-ENTMCNC: 31.3 GM/DL
MCV RBC AUTO: 91.2 FL
MONOCYTES # BLD AUTO: 0.45 K/UL
MONOCYTES NFR BLD AUTO: 5.3 %
NEUTROPHILS # BLD AUTO: 5.91 K/UL
NEUTROPHILS NFR BLD AUTO: 69.7 %
PHOSPHATE SERPL-MCNC: 3.7 MG/DL
PLATELET # BLD AUTO: 203 K/UL
POTASSIUM SERPL-SCNC: 4.1 MMOL/L
PT BLD: 10.8 SEC
RBC # BLD: 3.08 M/UL
RBC # FLD: 14.5 %
SODIUM SERPL-SCNC: 142 MMOL/L
WBC # FLD AUTO: 8.47 K/UL

## 2020-03-17 LAB
ALBUMIN SERPL ELPH-MCNC: 3.5 G/DL
ANION GAP SERPL CALC-SCNC: 14 MMOL/L
BASOPHILS # BLD AUTO: 0.04 K/UL
BASOPHILS NFR BLD AUTO: 0.5 %
BUN SERPL-MCNC: 34 MG/DL
CALCIUM SERPL-MCNC: 8.9 MG/DL
CHLORIDE SERPL-SCNC: 106 MMOL/L
CO2 SERPL-SCNC: 18 MMOL/L
CREAT SERPL-MCNC: 2.9 MG/DL
EOSINOPHIL # BLD AUTO: 0.9 K/UL
EOSINOPHIL NFR BLD AUTO: 11.8 %
GLUCOSE SERPL-MCNC: 100 MG/DL
HCT VFR BLD CALC: 27.9 %
HGB BLD-MCNC: 8.5 G/DL
IMM GRANULOCYTES NFR BLD AUTO: 0.8 %
LYMPHOCYTES # BLD AUTO: 1.1 K/UL
LYMPHOCYTES NFR BLD AUTO: 14.4 %
MAN DIFF?: NORMAL
MCHC RBC-ENTMCNC: 27.6 PG
MCHC RBC-ENTMCNC: 30.5 GM/DL
MCV RBC AUTO: 90.6 FL
MONOCYTES # BLD AUTO: 0.45 K/UL
MONOCYTES NFR BLD AUTO: 5.9 %
NEUTROPHILS # BLD AUTO: 5.08 K/UL
NEUTROPHILS NFR BLD AUTO: 66.6 %
PHOSPHATE SERPL-MCNC: 3.3 MG/DL
PLATELET # BLD AUTO: 183 K/UL
POTASSIUM SERPL-SCNC: 4.3 MMOL/L
RBC # BLD: 3.08 M/UL
RBC # FLD: 14.3 %
SODIUM SERPL-SCNC: 139 MMOL/L
WBC # FLD AUTO: 7.63 K/UL

## 2020-03-17 RX ORDER — NIFEDIPINE 30 MG/1
30 TABLET, EXTENDED RELEASE ORAL DAILY
Qty: 90 | Refills: 0 | Status: ACTIVE | COMMUNITY
Start: 2020-03-17 | End: 1900-01-01

## 2020-03-18 ENCOUNTER — APPOINTMENT (OUTPATIENT)
Dept: ULTRASOUND IMAGING | Facility: HOSPITAL | Age: 35
End: 2020-03-18

## 2020-03-24 ENCOUNTER — APPOINTMENT (OUTPATIENT)
Dept: OBGYN | Facility: CLINIC | Age: 35
End: 2020-03-24

## 2020-04-08 ENCOUNTER — APPOINTMENT (OUTPATIENT)
Dept: OBGYN | Facility: CLINIC | Age: 35
End: 2020-04-08

## 2020-06-09 ENCOUNTER — APPOINTMENT (OUTPATIENT)
Dept: NEPHROLOGY | Facility: CLINIC | Age: 35
End: 2020-06-09
Payer: COMMERCIAL

## 2020-06-09 DIAGNOSIS — N17.9 ACUTE KIDNEY FAILURE, UNSPECIFIED: ICD-10-CM

## 2020-06-09 PROCEDURE — 99214 OFFICE O/P EST MOD 30 MIN: CPT | Mod: 95

## 2020-06-09 NOTE — ASSESSMENT
[FreeTextEntry1] : 34 year old woman with h/o HTNx 3 years, postpartum PEC 3 years ago complicated by proteinuria, KAYLA, HTN which all never completely resolved, and now s/p pregnancy postpartum with likely PEC again 4 mos ago, KAYLA, and proteinuria which did not resolve and in fact worsening\par \par #KAYLA 4 mos ago with prob ATN at time of Hypertensive Urgency  +/- underlying renal/ glomerular disease, CKD\par and proteinuria-  hiv, hbv, hcv, c3,c4 rpr, normal inpatient labs, check pla2r, free light chains, hba1c nl outpt\par suggested renal biopsy in March but due to height of covid19 pandemic patient and  refused\par discussed now cr has gone up from last labs 2.9 in March and now 3.9; they do not have any in between labs\par will repeat and then schedule for renal biopsy- will need anesthesia and need to discard her breastmilk after biopsyx2 and also will need to avoid heavy lifting after biopsy; will likely need ddavp; repeat renal panel this week\par schedule biopsy depending on labs\par \par #HTN- controlled; chronic HTN  \par -will need secondary workup\par -cont nifedipine 60mg bid; continue Labetolol 200mg bid\par -she will cont to monitor BP \par -low salt diet\par --will not start hydralazine yet\par \par #anemia- check cbc, iron studies, ldh, haptoglobin\par \par #hyperklameia- low k diet suggested; repeat \par  \par Labs reviewed in detail with patient\par done with PCP 6/6: cr 3.9; k 5.2\par hb 9\par \par advised to avoid nsaids, herbal, otc meds\par all questions answered for patient and her \par

## 2020-06-09 NOTE — HISTORY OF PRESENT ILLNESS
[Home] : at home, [unfilled] , at the time of the visit. [Medical Office: ([facility])___] : at the medical office located in  [Verbal consent obtained from patient] : the patient, [unfilled] [FreeTextEntry1] : televisit done due to covid19 pandemic\par Patient called due to abnormal labs. \par Patient is working from home\par BP: Labetolol 200mg po bid; Nifedipine 60/60\par Avg BP at home 130/80-85; Three days ago /95, watched BP and after one hour it went back down to 130s.  This spike is rare per patient. PCP gave script for hydalazine 10mg bid but she has not taken.  \par Baby 4 months post partum. she is breastfeeding.  she breastfeeds approx 1L, but also drinks approx 3L water/day\par \par Feels nauseous after eating, denies reflux or heartburn, appetite normal, sleeping patterns okay, sleep interrupted due to feeding baby at night.  Denies metallic taste in mouth.  Javier sob.  urinating same amount. denies leg swelling. no sob.  \par States watery eyes, itchy nose, on Citrazine for this\par denies nsaids, otc meds, herbal medications

## 2020-06-09 NOTE — PHYSICAL EXAM
[General Appearance - Alert] : alert [Sclera] : the sclera and conjunctiva were normal [General Appearance - In No Acute Distress] : in no acute distress [Outer Ear] : the ears and nose were normal in appearance [Neck Appearance] : the appearance of the neck was normal [Edema] : there was no peripheral edema [Impaired Insight] : insight and judgment were intact

## 2020-06-15 LAB
ALBUMIN SERPL ELPH-MCNC: 4.1 G/DL
ANION GAP SERPL CALC-SCNC: 16 MMOL/L
APPEARANCE: CLEAR
APTT BLD: 33.1 SEC
BACTERIA UR CULT: NORMAL
BACTERIA: NEGATIVE
BASOPHILS # BLD AUTO: 0.02 K/UL
BASOPHILS NFR BLD AUTO: 0.3 %
BILIRUBIN URINE: NEGATIVE
BLOOD URINE: NORMAL
BUN SERPL-MCNC: 47 MG/DL
CALCIUM SERPL-MCNC: 9.1 MG/DL
CHLORIDE SERPL-SCNC: 108 MMOL/L
CO2 SERPL-SCNC: 17 MMOL/L
COLOR: COLORLESS
CREAT SERPL-MCNC: 4.23 MG/DL
CREAT SPEC-SCNC: 65 MG/DL
CREAT/PROT UR: 3.3 RATIO
EOSINOPHIL # BLD AUTO: 0.81 K/UL
EOSINOPHIL NFR BLD AUTO: 12.7 %
FERRITIN SERPL-MCNC: 77 NG/ML
GLUCOSE QUALITATIVE U: NEGATIVE
GLUCOSE SERPL-MCNC: 116 MG/DL
HCT VFR BLD CALC: 28.1 %
HGB BLD-MCNC: 8.8 G/DL
HYALINE CASTS: 0 /LPF
IMM GRANULOCYTES NFR BLD AUTO: 0.3 %
INR PPP: 0.97 RATIO
IRON SATN MFR SERPL: 21 %
IRON SERPL-MCNC: 57 UG/DL
KETONES URINE: NEGATIVE
LEUKOCYTE ESTERASE URINE: NEGATIVE
LYMPHOCYTES # BLD AUTO: 0.97 K/UL
LYMPHOCYTES NFR BLD AUTO: 15.2 %
MAN DIFF?: NORMAL
MCHC RBC-ENTMCNC: 26.7 PG
MCHC RBC-ENTMCNC: 31.3 GM/DL
MCV RBC AUTO: 85.4 FL
MICROSCOPIC-UA: NORMAL
MONOCYTES # BLD AUTO: 0.39 K/UL
MONOCYTES NFR BLD AUTO: 6.1 %
NEUTROPHILS # BLD AUTO: 4.16 K/UL
NEUTROPHILS NFR BLD AUTO: 65.4 %
NITRITE URINE: NEGATIVE
PH URINE: 6
PHOSPHATE SERPL-MCNC: 4.7 MG/DL
PLATELET # BLD AUTO: 224 K/UL
POTASSIUM SERPL-SCNC: 4.7 MMOL/L
PROT UR-MCNC: 211 MG/DL
PROTEIN URINE: ABNORMAL
PT BLD: 11.1 SEC
RBC # BLD: 3.29 M/UL
RBC # FLD: 14.3 %
RED BLOOD CELLS URINE: 6 /HPF
SODIUM SERPL-SCNC: 141 MMOL/L
SPECIFIC GRAVITY URINE: 1.01
SQUAMOUS EPITHELIAL CELLS: 2 /HPF
TIBC SERPL-MCNC: 272 UG/DL
UIBC SERPL-MCNC: 215 UG/DL
UROBILINOGEN URINE: NORMAL
WBC # FLD AUTO: 6.37 K/UL
WHITE BLOOD CELLS URINE: 2 /HPF

## 2020-06-20 ENCOUNTER — APPOINTMENT (OUTPATIENT)
Dept: DISASTER EMERGENCY | Facility: CLINIC | Age: 35
End: 2020-06-20

## 2020-06-20 DIAGNOSIS — Z01.818 ENCOUNTER FOR OTHER PREPROCEDURAL EXAMINATION: ICD-10-CM

## 2020-06-20 LAB — SARS-COV-2 N GENE NPH QL NAA+PROBE: NOT DETECTED

## 2020-06-22 ENCOUNTER — APPOINTMENT (OUTPATIENT)
Dept: ULTRASOUND IMAGING | Facility: HOSPITAL | Age: 35
End: 2020-06-22

## 2020-06-22 ENCOUNTER — OUTPATIENT (OUTPATIENT)
Dept: OUTPATIENT SERVICES | Facility: HOSPITAL | Age: 35
LOS: 1 days | End: 2020-06-22
Payer: MEDICAID

## 2020-06-22 ENCOUNTER — RESULT REVIEW (OUTPATIENT)
Age: 35
End: 2020-06-22

## 2020-06-22 DIAGNOSIS — R10.9 UNSPECIFIED ABDOMINAL PAIN: ICD-10-CM

## 2020-06-22 DIAGNOSIS — Z90.49 ACQUIRED ABSENCE OF OTHER SPECIFIED PARTS OF DIGESTIVE TRACT: Chronic | ICD-10-CM

## 2020-06-22 DIAGNOSIS — Z98.891 HISTORY OF UTERINE SCAR FROM PREVIOUS SURGERY: Chronic | ICD-10-CM

## 2020-06-22 DIAGNOSIS — N17.9 ACUTE KIDNEY FAILURE, UNSPECIFIED: ICD-10-CM

## 2020-06-22 PROCEDURE — 88305 TISSUE EXAM BY PATHOLOGIST: CPT | Mod: 26

## 2020-06-22 PROCEDURE — 88313 SPECIAL STAINS GROUP 2: CPT

## 2020-06-22 PROCEDURE — 76942 ECHO GUIDE FOR BIOPSY: CPT | Mod: 26

## 2020-06-22 PROCEDURE — 88348 ELECTRON MICROSCOPY DX: CPT

## 2020-06-22 PROCEDURE — 76942 ECHO GUIDE FOR BIOPSY: CPT

## 2020-06-22 PROCEDURE — 88313 SPECIAL STAINS GROUP 2: CPT | Mod: 26

## 2020-06-22 PROCEDURE — 88305 TISSUE EXAM BY PATHOLOGIST: CPT

## 2020-06-22 PROCEDURE — 88350 IMFLUOR EA ADDL 1ANTB STN PX: CPT | Mod: 26

## 2020-06-22 PROCEDURE — 88346 IMFLUOR 1ST 1ANTB STAIN PX: CPT

## 2020-06-22 PROCEDURE — 88346 IMFLUOR 1ST 1ANTB STAIN PX: CPT | Mod: 26

## 2020-06-22 PROCEDURE — 88350 IMFLUOR EA ADDL 1ANTB STN PX: CPT

## 2020-06-22 PROCEDURE — 88312 SPECIAL STAINS GROUP 1: CPT | Mod: 26

## 2020-06-22 PROCEDURE — 88348 ELECTRON MICROSCOPY DX: CPT | Mod: 26

## 2020-06-22 PROCEDURE — 50200 RENAL BIOPSY PERQ: CPT

## 2020-06-22 PROCEDURE — 88312 SPECIAL STAINS GROUP 1: CPT

## 2020-06-22 PROCEDURE — 50200 RENAL BIOPSY PERQ: CPT | Mod: LT

## 2020-06-24 LAB — SURGICAL PATHOLOGY STUDY: SIGNIFICANT CHANGE UP

## 2020-06-30 LAB
ALBUMIN SERPL ELPH-MCNC: 4.2 G/DL
ANION GAP SERPL CALC-SCNC: 15 MMOL/L
BASOPHILS # BLD AUTO: 0.04 K/UL
BASOPHILS NFR BLD AUTO: 0.5 %
BUN SERPL-MCNC: 51 MG/DL
CALCIUM SERPL-MCNC: 9 MG/DL
CHLORIDE SERPL-SCNC: 106 MMOL/L
CO2 SERPL-SCNC: 15 MMOL/L
CREAT SERPL-MCNC: 4.73 MG/DL
EOSINOPHIL # BLD AUTO: 0.85 K/UL
EOSINOPHIL NFR BLD AUTO: 10.2 %
FERRITIN SERPL-MCNC: 93 NG/ML
GLUCOSE SERPL-MCNC: 123 MG/DL
HBV CORE IGG+IGM SER QL: NONREACTIVE
HBV CORE IGM SER QL: NONREACTIVE
HBV SURFACE AB SER QL: REACTIVE
HBV SURFACE AB SERPL IA-ACNC: 330.9 MIU/ML
HBV SURFACE AG SER QL: NONREACTIVE
HCT VFR BLD CALC: 25.9 %
HGB BLD-MCNC: 8.4 G/DL
IMM GRANULOCYTES NFR BLD AUTO: 0.4 %
IRON SATN MFR SERPL: 9 %
IRON SERPL-MCNC: 23 UG/DL
LYMPHOCYTES # BLD AUTO: 1.09 K/UL
LYMPHOCYTES NFR BLD AUTO: 13 %
MAN DIFF?: NORMAL
MCHC RBC-ENTMCNC: 27.2 PG
MCHC RBC-ENTMCNC: 32.4 GM/DL
MCV RBC AUTO: 83.8 FL
MONOCYTES # BLD AUTO: 0.52 K/UL
MONOCYTES NFR BLD AUTO: 6.2 %
NEUTROPHILS # BLD AUTO: 5.83 K/UL
NEUTROPHILS NFR BLD AUTO: 69.7 %
PHOSPHATE SERPL-MCNC: 4.6 MG/DL
PLATELET # BLD AUTO: 266 K/UL
POTASSIUM SERPL-SCNC: 5 MMOL/L
RBC # BLD: 3.09 M/UL
RBC # FLD: 14.5 %
SODIUM SERPL-SCNC: 137 MMOL/L
TIBC SERPL-MCNC: 263 UG/DL
UIBC SERPL-MCNC: 240 UG/DL
WBC # FLD AUTO: 8.36 K/UL

## 2020-06-30 RX ORDER — CHLORHEXIDINE GLUCONATE 4 %
325 (65 FE) LIQUID (ML) TOPICAL
Qty: 270 | Refills: 3 | Status: ACTIVE | COMMUNITY
Start: 2020-06-30 | End: 1900-01-01

## 2020-06-30 RX ORDER — SODIUM BICARBONATE 650 MG/1
650 TABLET ORAL 3 TIMES DAILY
Qty: 520 | Refills: 0 | Status: ACTIVE | COMMUNITY
Start: 2020-06-30 | End: 1900-01-01

## 2020-07-01 LAB
M TB IFN-G BLD-IMP: NEGATIVE
QUANTIFERON TB PLUS MITOGEN MINUS NIL: 6.59 IU/ML
QUANTIFERON TB PLUS NIL: 0.01 IU/ML
QUANTIFERON TB PLUS TB1 MINUS NIL: 0.01 IU/ML
QUANTIFERON TB PLUS TB2 MINUS NIL: 0 IU/ML

## 2020-08-25 ENCOUNTER — APPOINTMENT (OUTPATIENT)
Dept: TRANSPLANT | Facility: CLINIC | Age: 35
End: 2020-08-25

## 2020-08-25 ENCOUNTER — APPOINTMENT (OUTPATIENT)
Dept: NEPHROLOGY | Facility: CLINIC | Age: 35
End: 2020-08-25

## 2021-06-02 NOTE — DISCHARGE NOTE PROVIDER - NSDCFUADDAPPT_GEN_ALL_CORE_FT
- Continue BP meds as prescribed (hold is BP is under 110/60 or HR is under 60)  -Take blood pressure with at home cuff prior to taking medications; if BP is >150/90 call MD  - Return to hospital with headaches, visual changes, abdominal pain, nausea, vomiting, chest pain or shortness of breathe  - Follow up with OB in 2 days for BP check  - Follow up with Cardiology   - Follow up with Nephrology within one week Oculoplastic Surgeon Procedure Text (A): After obtaining clear surgical margins the patient was sent to oculoplastics for surgical repair.  The patient understands they will receive post-surgical care and follow-up from the referring physician's office.

## 2023-02-09 NOTE — ED PROVIDER NOTE - NEUROLOGICAL, MLM
Alert and oriented, no focal deficits, no motor or sensory deficits. V-Y Plasty Text: Because of the full-thickness nature of the wound and to preserve nearby structures, a V-toY Advancement flap was planned. After prep and local anesthesia, a Burow’s triangle was excised adjacent to the defect.  Opposite from this, two smaller Burow’s triangles were excised.  Three flaps were created by undermining in the deep subcutaneous plane. After hemostasis, the flaps were advanced and closed in a layered fashion.

## 2024-05-22 NOTE — H&P ADULT - NSICDXFAMILYHX_GEN_ALL_CORE_FT
FAMILY HISTORY:  Father  Still living? No  Diabetes mellitus, Age at diagnosis: Age Unknown  Family history of heart attack, Age at diagnosis: Age Unknown  Hypertension, Age at diagnosis: Age Unknown    Mother  Still living? Yes, Estimated age: Age Unknown  Asthma, Age at diagnosis: Age Unknown no

## 2024-05-28 ENCOUNTER — LABORATORY RESULT (OUTPATIENT)
Age: 39
End: 2024-05-28

## 2024-05-28 ENCOUNTER — APPOINTMENT (OUTPATIENT)
Dept: NEPHROLOGY | Facility: CLINIC | Age: 39
End: 2024-05-28
Payer: COMMERCIAL

## 2024-05-28 VITALS
WEIGHT: 169.75 LBS | HEART RATE: 75 BPM | DIASTOLIC BLOOD PRESSURE: 86 MMHG | TEMPERATURE: 98.1 F | SYSTOLIC BLOOD PRESSURE: 145 MMHG | BODY MASS INDEX: 26.64 KG/M2 | OXYGEN SATURATION: 98 % | HEIGHT: 67 IN

## 2024-05-28 VITALS — DIASTOLIC BLOOD PRESSURE: 86 MMHG | SYSTOLIC BLOOD PRESSURE: 150 MMHG

## 2024-05-28 DIAGNOSIS — R80.9 PROTEINURIA, UNSPECIFIED: ICD-10-CM

## 2024-05-28 DIAGNOSIS — E55.9 VITAMIN D DEFICIENCY, UNSPECIFIED: ICD-10-CM

## 2024-05-28 DIAGNOSIS — I10 ESSENTIAL (PRIMARY) HYPERTENSION: ICD-10-CM

## 2024-05-28 PROCEDURE — 99204 OFFICE O/P NEW MOD 45 MIN: CPT

## 2024-05-29 ENCOUNTER — NON-APPOINTMENT (OUTPATIENT)
Age: 39
End: 2024-05-29

## 2024-05-29 LAB
25(OH)D3 SERPL-MCNC: 17.6 NG/ML
ALBUMIN SERPL ELPH-MCNC: 4.2 G/DL
ALP BLD-CCNC: 78 U/L
ALT SERPL-CCNC: 26 U/L
ANION GAP SERPL CALC-SCNC: 15 MMOL/L
APPEARANCE: CLEAR
AST SERPL-CCNC: 22 U/L
BACTERIA: NEGATIVE /HPF
BILIRUB SERPL-MCNC: 0.5 MG/DL
BILIRUBIN URINE: NEGATIVE
BLOOD URINE: ABNORMAL
BUN SERPL-MCNC: 13 MG/DL
C PEPTIDE SERPL-MCNC: 11.1 NG/ML
CALCIUM SERPL-MCNC: 9.9 MG/DL
CALCIUM SERPL-MCNC: 9.9 MG/DL
CAST: NORMAL /LPF
CHLORIDE SERPL-SCNC: 108 MMOL/L
CHOLEST SERPL-MCNC: 165 MG/DL
CO2 SERPL-SCNC: 20 MMOL/L
COLOR: YELLOW
CREAT SERPL-MCNC: 0.86 MG/DL
CREAT SPEC-SCNC: 72 MG/DL
CREAT/PROT UR: 1.1 RATIO
EGFR: 89 ML/MIN/1.73M2
EPITHELIAL CELLS: 0 /HPF
ESTIMATED AVERAGE GLUCOSE: 126 MG/DL
FERRITIN SERPL-MCNC: 24 NG/ML
GLUCOSE QUALITATIVE U: >=1000 MG/DL
GLUCOSE SERPL-MCNC: 164 MG/DL
HBA1C MFR BLD HPLC: 6 %
HBV CORE IGG+IGM SER QL: NONREACTIVE
HBV CORE IGM SER QL: NONREACTIVE
HBV SURFACE AB SER QL: REACTIVE
HBV SURFACE AB SERPL IA-ACNC: 87.9 MIU/ML
HCT VFR BLD CALC: 40.7 %
HCV AB SER QL: NONREACTIVE
HCV S/CO RATIO: 0.18 S/CO
HDLC SERPL-MCNC: 43 MG/DL
HGB BLD-MCNC: 11.9 G/DL
HIV1+2 AB SPEC QL IA.RAPID: NONREACTIVE
IRON SATN MFR SERPL: 11 %
IRON SERPL-MCNC: 42 UG/DL
KETONES URINE: NEGATIVE MG/DL
LDH SERPL-CCNC: 166 U/L
LDLC SERPL CALC-MCNC: 93 MG/DL
LEUKOCYTE ESTERASE URINE: NEGATIVE
MAGNESIUM SERPL-MCNC: 1.6 MG/DL
MCHC RBC-ENTMCNC: 23.5 PG
MCHC RBC-ENTMCNC: 29.2 GM/DL
MCV RBC AUTO: 80.3 FL
MICROSCOPIC-UA: NORMAL
NITRITE URINE: NEGATIVE
NONHDLC SERPL-MCNC: 122 MG/DL
PARATHYROID HORMONE INTACT: 42 PG/ML
PH URINE: 5.5
PLATELET # BLD AUTO: 319 K/UL
POTASSIUM SERPL-SCNC: 4.4 MMOL/L
PROT SERPL-MCNC: 7.3 G/DL
PROT UR-MCNC: 77 MG/DL
PROTEIN URINE: 100 MG/DL
RBC # BLD: 5.07 M/UL
RBC # FLD: 17.1 %
RED BLOOD CELLS URINE: 3 /HPF
REVIEW: NORMAL
SODIUM SERPL-SCNC: 143 MMOL/L
SPECIFIC GRAVITY URINE: 1.03
TACROLIMUS SERPL-MCNC: 7.6 NG/ML
TIBC SERPL-MCNC: 366 UG/DL
TRIGL SERPL-MCNC: 166 MG/DL
UIBC SERPL-MCNC: 325 UG/DL
URATE SERPL-MCNC: 6.8 MG/DL
UROBILINOGEN URINE: 0.2 MG/DL
WBC # FLD AUTO: 6.52 K/UL
WHITE BLOOD CELLS URINE: 0 /HPF

## 2024-05-29 RX ORDER — NIFEDIPINE 60 MG/1
60 TABLET, EXTENDED RELEASE ORAL
Qty: 60 | Refills: 2 | Status: DISCONTINUED | COMMUNITY
Start: 2020-03-03 | End: 2024-05-29

## 2024-05-29 RX ORDER — NIFEDIPINE 90 MG/1
90 TABLET, EXTENDED RELEASE ORAL DAILY
Qty: 30 | Refills: 3 | Status: DISCONTINUED | COMMUNITY
Start: 2019-08-06 | End: 2024-05-29

## 2024-05-29 RX ORDER — LABETALOL HYDROCHLORIDE 300 MG/1
300 TABLET, FILM COATED ORAL
Qty: 180 | Refills: 2 | Status: DISCONTINUED | COMMUNITY
Start: 2020-06-25 | End: 2024-05-29

## 2024-05-29 RX ORDER — NIFEDIPINE 90 MG/1
90 TABLET, EXTENDED RELEASE ORAL DAILY
Qty: 60 | Refills: 2 | Status: DISCONTINUED | COMMUNITY
Start: 2019-11-26 | End: 2024-05-29

## 2024-05-29 RX ORDER — PRENATAL WITH FERROUS FUM AND FOLIC ACID 3080; 920; 120; 400; 22; 1.84; 3; 20; 10; 1; 12; 200; 27; 25; 2 [IU]/1; [IU]/1; MG/1; [IU]/1; MG/1; MG/1; MG/1; MG/1; MG/1; MG/1; UG/1; MG/1; MG/1; MG/1; MG/1
27-1 TABLET ORAL DAILY
Qty: 90 | Refills: 2 | Status: DISCONTINUED | COMMUNITY
Start: 2019-08-06 | End: 2024-05-29

## 2024-05-30 PROBLEM — E55.9 VITAMIN D DEFICIENCY: Status: ACTIVE | Noted: 2024-05-29

## 2024-05-30 LAB
ANA PAT FLD IF-IMP: ABNORMAL
ANA SER IF-ACNC: ABNORMAL
C3 SERPL-MCNC: 184 MG/DL
C4 SERPL-MCNC: 28 MG/DL
CMV DNA SPEC QL NAA+PROBE: NOT DETECTED IU/ML
CMVPCR LOG: NOT DETECTED LOG10IU/ML
DEPRECATED KAPPA LC FREE/LAMBDA SER: 0.93 RATIO
DSDNA AB SER-ACNC: 1 IU/ML
KAPPA LC CSF-MCNC: 4.48 MG/DL
KAPPA LC SERPL-MCNC: 4.18 MG/DL

## 2024-05-30 NOTE — PHYSICAL EXAM
[General Appearance - Alert] : alert [General Appearance - In No Acute Distress] : in no acute distress [General Appearance - Well Nourished] : well nourished [General Appearance - Well Developed] : well developed [Sclera] : the sclera and conjunctiva were normal [Outer Ear] : the ears and nose were normal in appearance [Neck Appearance] : the appearance of the neck was normal [Respiration, Rhythm And Depth] : normal respiratory rhythm and effort [Auscultation Breath Sounds / Voice Sounds] : lungs were clear to auscultation bilaterally [Apical Impulse] : the apical impulse was normal [Heart Rate And Rhythm] : heart rate was normal and rhythm regular [Heart Sounds] : normal S1 and S2 [Edema] : there was no peripheral edema [Bowel Sounds] : normal bowel sounds [Abdomen Soft] : soft [Abdomen Tenderness] : non-tender [No CVA Tenderness] : no ~M costovertebral angle tenderness [Abnormal Walk] : normal gait [Musculoskeletal - Swelling] : no joint swelling seen [Skin Color & Pigmentation] : normal skin color and pigmentation [Skin Turgor] : normal skin turgor [] : no rash [No Focal Deficits] : no focal deficits [Oriented To Time, Place, And Person] : oriented to person, place, and time [Impaired Insight] : insight and judgment were intact [Affect] : the affect was normal

## 2024-05-30 NOTE — HISTORY OF PRESENT ILLNESS
[FreeTextEntry1] : Here for care for Kidney Transplant 38 year old: HTN, DM post transplant  IGAN nephropthay advances/ progressed post partum 2020 Started Dialysis, PD at [facility] August 2020, for couple of months then had a Kidney Transplant in November 2020, Living Donor from Brother Pt stated her Nephrologist Dr Amos Nelson from [facility] moved to [place] so she has transitioned care her. Dr John Aviles did the Surgery  Pt reports she had labs in January 2024 but Nephrologist had left the system and didnt call with lab results Pt denies complications of transplant, no episodes of rejections, no hospitalizations, no urinary infections or opportunistic infections In Feb 2024 she had Hernia repair- periumbilical @ [facility]  Recent HTN in 2023 when she went to PMD BP was 170s/100s; She was given Metoprolol. BP on this  126-130s/86s; Checks her BP once/week.  Watches salt in diet; Watches sugar in diet Pt states she feels well now. Denies SOB, LE edema, cloudy urine, bubbles in urine, gross hematuria Other ROS neg  Children: 3 yo, 8yo; two girls;   Medications: Tacro 1/0.5- last levels checked 6 months+ Cellcept 500mg bid  (?GI side effects) Prednisone was stopped in few months (FSG was going up) Farxiga 5mg daily Metoprolol 25mg daily   Prior labs provided to me by patient at visit Dec 2023: 1+blood/2_prot Jan 2024 2+prot/ 2+ Jan cr 1.2; Dec 2023 0.75 micro/cr 481

## 2024-05-30 NOTE — ASSESSMENT
[FreeTextEntry1] : 38 year old: HTN, DM post transplant IGAN nephropthay advance post partum 2020 Started Dialysis, PD at [facility] August 2020, for couple of months Kidney Transplant November 2020, Living Donor from [facility]   #Renal tx LRRT 2020  Tacro 1/0.5- last levels checked 6 months+; took tacro today Cellcept 500mg bid  (?GI side effects) Prednisone was stopped in few months (FSG was going up)  #post tx DM- on Farxiga 5mg daily; check a1c  #HTN- Metoprolol 25mg daily  #labs Jan 2024:: proteinuria Obtain reports Dec 2023: 1+blood/2_prot Jan 2024 2+prot/ 2+ Jan cr 1.2; Dec 2023 0.75 micro/cr 481 check ua, prot/cr, renal panel and labs today   #HM- Derm one year ago; Gave # #HM- Gave # for GYN Dr Garza  for Pap

## 2024-05-31 LAB
ALBUMIN MFR SERPL ELPH: 51 %
ALBUMIN SERPL-MCNC: 3.7 G/DL
ALBUMIN/GLOB SERPL: 1 RATIO
ALPHA1 GLOB MFR SERPL ELPH: 4.1 %
ALPHA1 GLOB SERPL ELPH-MCNC: 0.3 G/DL
ALPHA2 GLOB MFR SERPL ELPH: 10.5 %
ALPHA2 GLOB SERPL ELPH-MCNC: 0.8 G/DL
B-GLOBULIN MFR SERPL ELPH: 13.2 %
B-GLOBULIN SERPL ELPH-MCNC: 1 G/DL
BACTERIA UR CULT: ABNORMAL
BKV DNA SPEC QL NAA+PROBE: NOT DETECTED IU/ML
GAMMA GLOB FLD ELPH-MCNC: 1.5 G/DL
GAMMA GLOB MFR SERPL ELPH: 21.2 %
GBM AB TITR SER IF: <0.2
INTERPRETATION SERPL IEP-IMP: NORMAL
M PROTEIN SPEC IFE-MCNC: NORMAL
PROT SERPL-MCNC: 7.3 G/DL
PROT SERPL-MCNC: 7.3 G/DL
RPR SER-TITR: NORMAL

## 2024-06-03 LAB — PHOSPHOLIPASE A2 RECEPTOR ELISA: <1.8 RU/ML

## 2024-06-07 ENCOUNTER — APPOINTMENT (OUTPATIENT)
Dept: ULTRASOUND IMAGING | Facility: IMAGING CENTER | Age: 39
End: 2024-06-07
Payer: COMMERCIAL

## 2024-06-07 ENCOUNTER — OUTPATIENT (OUTPATIENT)
Dept: OUTPATIENT SERVICES | Facility: HOSPITAL | Age: 39
LOS: 1 days | End: 2024-06-07
Payer: COMMERCIAL

## 2024-06-07 DIAGNOSIS — I10 ESSENTIAL (PRIMARY) HYPERTENSION: ICD-10-CM

## 2024-06-07 DIAGNOSIS — Z90.49 ACQUIRED ABSENCE OF OTHER SPECIFIED PARTS OF DIGESTIVE TRACT: Chronic | ICD-10-CM

## 2024-06-07 DIAGNOSIS — Z98.891 HISTORY OF UTERINE SCAR FROM PREVIOUS SURGERY: Chronic | ICD-10-CM

## 2024-06-07 PROCEDURE — 76776 US EXAM K TRANSPL W/DOPPLER: CPT | Mod: 26,RT

## 2024-06-07 PROCEDURE — 76776 US EXAM K TRANSPL W/DOPPLER: CPT

## 2024-06-07 PROCEDURE — 76770 US EXAM ABDO BACK WALL COMP: CPT | Mod: 26

## 2024-06-07 PROCEDURE — 76770 US EXAM ABDO BACK WALL COMP: CPT

## 2024-06-11 RX ORDER — MYCOPHENOLATE MOFETIL 500 MG/1
500 TABLET ORAL TWICE DAILY
Qty: 90 | Refills: 5 | Status: ACTIVE | COMMUNITY
Start: 2024-06-11 | End: 1900-01-01

## 2024-06-11 RX ORDER — TACROLIMUS 0.5 MG/1
0.5 CAPSULE ORAL
Qty: 90 | Refills: 3 | Status: ACTIVE | COMMUNITY
Start: 2024-06-11 | End: 1900-01-01

## 2024-06-11 RX ORDER — TACROLIMUS 1 MG/1
1 CAPSULE ORAL
Qty: 180 | Refills: 3 | Status: ACTIVE | COMMUNITY
Start: 2024-06-11 | End: 1900-01-01

## 2024-06-11 RX ORDER — ADHESIVE TAPE 3"X 2.3 YD
50 MCG TAPE, NON-MEDICATED TOPICAL
Qty: 90 | Refills: 3 | Status: ACTIVE | COMMUNITY
Start: 2024-05-29 | End: 1900-01-01

## 2024-06-11 RX ORDER — DAPAGLIFLOZIN 10 MG/1
10 TABLET, FILM COATED ORAL DAILY
Qty: 90 | Refills: 3 | Status: ACTIVE | COMMUNITY
Start: 2024-06-11 | End: 1900-01-01

## 2024-06-18 ENCOUNTER — NON-APPOINTMENT (OUTPATIENT)
Age: 39
End: 2024-06-18

## 2024-06-18 DIAGNOSIS — D84.9 IMMUNODEFICIENCY, UNSPECIFIED: ICD-10-CM

## 2024-06-18 DIAGNOSIS — Z94.0 KIDNEY TRANSPLANT STATUS: ICD-10-CM

## 2024-06-18 LAB
APPEARANCE: CLEAR
BACTERIA: NEGATIVE /HPF
BILIRUBIN URINE: NEGATIVE
BLOOD URINE: ABNORMAL
CAST: NORMAL /LPF
COLOR: YELLOW
CREAT SPEC-SCNC: 112 MG/DL
CREAT/PROT UR: 1.3 RATIO
EPITHELIAL CELLS: 2 /HPF
GLUCOSE QUALITATIVE U: >=1000 MG/DL
KETONES URINE: NEGATIVE MG/DL
LEUKOCYTE ESTERASE URINE: NEGATIVE
MICROSCOPIC-UA: NORMAL
NITRITE URINE: NEGATIVE
PH URINE: 5.5
PROT UR-MCNC: 143 MG/DL
PROTEIN URINE: 300 MG/DL
RED BLOOD CELLS URINE: 1 /HPF
REVIEW: NORMAL
SPECIFIC GRAVITY URINE: 1.03
UROBILINOGEN URINE: 0.2 MG/DL
WHITE BLOOD CELLS URINE: 1 /HPF

## 2024-06-25 RX ORDER — LOSARTAN POTASSIUM 25 MG/1
25 TABLET, FILM COATED ORAL
Qty: 30 | Refills: 2 | Status: ACTIVE | COMMUNITY
Start: 2024-06-18 | End: 1900-01-01

## 2024-07-01 NOTE — ED PROVIDER NOTE - CONSTITUTIONAL DEVELOPMENT, MLM
We are committed to providing you the best care possible.    If you receive a survey after visiting one of our offices, please take time to share your experience concerning your physician office visit.  These surveys are confidential and no health information about you is shared.    We are eager to improve for you and we are counting on your feedback to help make that happen.    
well developed

## 2025-02-05 ENCOUNTER — LABORATORY RESULT (OUTPATIENT)
Age: 40
End: 2025-02-05

## 2025-02-05 ENCOUNTER — APPOINTMENT (OUTPATIENT)
Dept: NEPHROLOGY | Facility: CLINIC | Age: 40
End: 2025-02-05
Payer: COMMERCIAL

## 2025-02-05 VITALS
WEIGHT: 169 LBS | BODY MASS INDEX: 26.53 KG/M2 | SYSTOLIC BLOOD PRESSURE: 142 MMHG | OXYGEN SATURATION: 95 % | DIASTOLIC BLOOD PRESSURE: 90 MMHG | TEMPERATURE: 98.3 F | HEART RATE: 98 BPM | HEIGHT: 67 IN

## 2025-02-05 VITALS — SYSTOLIC BLOOD PRESSURE: 140 MMHG | DIASTOLIC BLOOD PRESSURE: 92 MMHG

## 2025-02-05 DIAGNOSIS — R80.9 PROTEINURIA, UNSPECIFIED: ICD-10-CM

## 2025-02-05 LAB
APTT BLD: 29.7 SEC
ESTIMATED AVERAGE GLUCOSE: 123 MG/DL
HBA1C MFR BLD HPLC: 5.9 %
HCT VFR BLD CALC: 40.5 %
HGB BLD-MCNC: 13.3 G/DL
INR PPP: 0.85 RATIO
MCHC RBC-ENTMCNC: 27.1 PG
MCHC RBC-ENTMCNC: 32.8 G/DL
MCV RBC AUTO: 82.7 FL
PLATELET # BLD AUTO: 289 K/UL
PT BLD: 10 SEC
RBC # BLD: 4.9 M/UL
RBC # FLD: 14.2 %
TACROLIMUS SERPL-MCNC: 8.6 NG/ML
WBC # FLD AUTO: 8.07 K/UL

## 2025-02-05 PROCEDURE — 99214 OFFICE O/P EST MOD 30 MIN: CPT

## 2025-02-06 ENCOUNTER — NON-APPOINTMENT (OUTPATIENT)
Age: 40
End: 2025-02-06

## 2025-02-06 LAB
25(OH)D3 SERPL-MCNC: 25.8 NG/ML
ALBUMIN SERPL ELPH-MCNC: 4 G/DL
ALP BLD-CCNC: 75 U/L
ALT SERPL-CCNC: 18 U/L
ANION GAP SERPL CALC-SCNC: 16 MMOL/L
APPEARANCE: CLEAR
AST SERPL-CCNC: 17 U/L
BACTERIA: NEGATIVE /HPF
BILIRUB SERPL-MCNC: 0.3 MG/DL
BILIRUBIN URINE: NEGATIVE
BLOOD URINE: ABNORMAL
BUN SERPL-MCNC: 16 MG/DL
CALCIUM SERPL-MCNC: 9.9 MG/DL
CALCIUM SERPL-MCNC: 9.9 MG/DL
CAST: 2 /LPF
CHLORIDE SERPL-SCNC: 104 MMOL/L
CMV DNA SPEC QL NAA+PROBE: NOT DETECTED IU/ML
CMVPCR LOG: NOT DETECTED LOG10IU/ML
CO2 SERPL-SCNC: 22 MMOL/L
COLOR: YELLOW
CREAT SERPL-MCNC: 1.04 MG/DL
CREAT SPEC-SCNC: 63 MG/DL
CREAT/PROT UR: 1.4 RATIO
EGFR: 70 ML/MIN/1.73M2
EPITHELIAL CELLS: 1 /HPF
FERRITIN SERPL-MCNC: 35 NG/ML
GLUCOSE QUALITATIVE U: >=1000 MG/DL
GLUCOSE SERPL-MCNC: 104 MG/DL
IRON SATN MFR SERPL: 13 %
IRON SERPL-MCNC: 47 UG/DL
KETONES URINE: NEGATIVE MG/DL
LDH SERPL-CCNC: 196 U/L
LEUKOCYTE ESTERASE URINE: NEGATIVE
MAGNESIUM SERPL-MCNC: 1.6 MG/DL
MICROSCOPIC-UA: NORMAL
NITRITE URINE: NEGATIVE
PARATHYROID HORMONE INTACT: 27 PG/ML
PH URINE: 6
PHOSPHATE SERPL-MCNC: 3.2 MG/DL
POTASSIUM SERPL-SCNC: 4.4 MMOL/L
PROT SERPL-MCNC: 7.6 G/DL
PROT UR-MCNC: 90 MG/DL
PROTEIN URINE: 100 MG/DL
RED BLOOD CELLS URINE: 3 /HPF
SODIUM SERPL-SCNC: 142 MMOL/L
SPECIFIC GRAVITY URINE: 1.03
TIBC SERPL-MCNC: 365 UG/DL
UIBC SERPL-MCNC: 318 UG/DL
URATE SERPL-MCNC: 7.1 MG/DL
UROBILINOGEN URINE: 0.2 MG/DL
WHITE BLOOD CELLS URINE: 1 /HPF

## 2025-02-07 LAB — BKV DNA SPEC QL NAA+PROBE: NOT DETECTED IU/ML

## 2025-02-10 DIAGNOSIS — Z94.0 KIDNEY TRANSPLANT STATUS: ICD-10-CM

## 2025-02-10 DIAGNOSIS — I10 ESSENTIAL (PRIMARY) HYPERTENSION: ICD-10-CM

## 2025-02-10 DIAGNOSIS — D84.9 IMMUNODEFICIENCY, UNSPECIFIED: ICD-10-CM

## 2025-02-10 LAB
ALBUMIN MFR SERPL ELPH: 54 %
ALBUMIN SERPL-MCNC: 4.1 G/DL
ALBUMIN/GLOB SERPL: 1.2 RATIO
ALPHA1 GLOB MFR SERPL ELPH: 3.5 %
ALPHA1 GLOB SERPL ELPH-MCNC: 0.3 G/DL
ALPHA2 GLOB MFR SERPL ELPH: 9.7 %
ALPHA2 GLOB SERPL ELPH-MCNC: 0.7 G/DL
B-GLOBULIN MFR SERPL ELPH: 13 %
B-GLOBULIN SERPL ELPH-MCNC: 1 G/DL
GAMMA GLOB FLD ELPH-MCNC: 1.5 G/DL
GAMMA GLOB MFR SERPL ELPH: 19.8 %
INTERPRETATION SERPL IEP-IMP: NORMAL
PROT SERPL-MCNC: 7.6 G/DL
PROT SERPL-MCNC: 7.6 G/DL